# Patient Record
Sex: MALE | Race: WHITE | NOT HISPANIC OR LATINO | Employment: OTHER | URBAN - METROPOLITAN AREA
[De-identification: names, ages, dates, MRNs, and addresses within clinical notes are randomized per-mention and may not be internally consistent; named-entity substitution may affect disease eponyms.]

---

## 2023-03-29 PROBLEM — R39.15 URINARY URGENCY: Status: ACTIVE | Noted: 2023-03-29

## 2023-03-29 PROBLEM — N52.9 ED (ERECTILE DYSFUNCTION): Status: ACTIVE | Noted: 2023-03-29

## 2023-03-29 PROBLEM — E87.1 HYPONATREMIA: Status: ACTIVE | Noted: 2023-03-29

## 2023-03-29 PROBLEM — R73.09 ELEVATED GLUCOSE: Status: ACTIVE | Noted: 2023-03-29

## 2023-03-29 PROBLEM — N18.30 CKD (CHRONIC KIDNEY DISEASE) STAGE 3, GFR 30-59 ML/MIN (MULTI): Status: ACTIVE | Noted: 2023-03-29

## 2023-03-29 PROBLEM — D64.9 ANEMIA: Status: ACTIVE | Noted: 2023-03-29

## 2023-03-29 PROBLEM — K62.0: Status: ACTIVE | Noted: 2023-03-29

## 2023-03-29 PROBLEM — H90.3 BILATERAL SENSORINEURAL HEARING LOSS: Status: ACTIVE | Noted: 2023-03-29

## 2023-03-29 PROBLEM — J34.1 RETENTION CYST OF NASAL SINUS: Status: ACTIVE | Noted: 2023-03-29

## 2023-03-29 PROBLEM — G47.00 INSOMNIA: Status: ACTIVE | Noted: 2023-03-29

## 2023-03-29 PROBLEM — M26.69 TMJ CREPITUS: Status: ACTIVE | Noted: 2023-03-29

## 2023-03-29 PROBLEM — N28.1 RENAL CYST: Status: ACTIVE | Noted: 2023-03-29

## 2023-03-29 PROBLEM — E78.5 HYPERLIPIDEMIA: Status: ACTIVE | Noted: 2023-03-29

## 2023-03-29 PROBLEM — J32.9 CHRONIC SINUSITIS: Status: ACTIVE | Noted: 2023-03-29

## 2023-03-29 PROBLEM — R97.20 ELEVATED PSA: Status: ACTIVE | Noted: 2023-03-29

## 2023-03-29 PROBLEM — E03.8 SUBCLINICAL HYPOTHYROIDISM: Status: ACTIVE | Noted: 2023-03-29

## 2023-03-29 PROBLEM — R90.89 ABNORMAL BRAIN MRI: Status: ACTIVE | Noted: 2023-03-29

## 2023-03-29 PROBLEM — R79.89 ELEVATED SERUM CREATININE: Status: ACTIVE | Noted: 2023-03-29

## 2023-03-29 PROBLEM — R74.01 ELEVATED AST (SGOT): Status: ACTIVE | Noted: 2023-03-29

## 2023-03-29 PROBLEM — N40.0 BENIGN ENLARGEMENT OF PROSTATE: Status: ACTIVE | Noted: 2023-03-29

## 2023-04-11 ENCOUNTER — OFFICE VISIT (OUTPATIENT)
Dept: PRIMARY CARE | Facility: CLINIC | Age: 68
End: 2023-04-11
Payer: MEDICARE

## 2023-04-11 VITALS
WEIGHT: 185.6 LBS | SYSTOLIC BLOOD PRESSURE: 106 MMHG | TEMPERATURE: 98.2 F | DIASTOLIC BLOOD PRESSURE: 64 MMHG | BODY MASS INDEX: 25.89 KG/M2

## 2023-04-11 DIAGNOSIS — R73.09 ELEVATED GLUCOSE: ICD-10-CM

## 2023-04-11 DIAGNOSIS — E03.8 SUBCLINICAL HYPOTHYROIDISM: ICD-10-CM

## 2023-04-11 DIAGNOSIS — D64.9 ANEMIA, UNSPECIFIED TYPE: ICD-10-CM

## 2023-04-11 DIAGNOSIS — E78.5 HYPERLIPIDEMIA, UNSPECIFIED HYPERLIPIDEMIA TYPE: ICD-10-CM

## 2023-04-11 DIAGNOSIS — M54.50 LEFT LOW BACK PAIN, UNSPECIFIED CHRONICITY, UNSPECIFIED WHETHER SCIATICA PRESENT: ICD-10-CM

## 2023-04-11 DIAGNOSIS — N18.31 STAGE 3A CHRONIC KIDNEY DISEASE (MULTI): Primary | ICD-10-CM

## 2023-04-11 DIAGNOSIS — R06.09 DOE (DYSPNEA ON EXERTION): ICD-10-CM

## 2023-04-11 DIAGNOSIS — R74.01 ELEVATED AST (SGOT): ICD-10-CM

## 2023-04-11 DIAGNOSIS — R97.20 ELEVATED PSA: ICD-10-CM

## 2023-04-11 PROBLEM — R79.89 ELEVATED SERUM CREATININE: Status: RESOLVED | Noted: 2023-03-29 | Resolved: 2023-04-11

## 2023-04-11 PROCEDURE — 99214 OFFICE O/P EST MOD 30 MIN: CPT | Performed by: INTERNAL MEDICINE

## 2023-04-11 PROCEDURE — 1160F RVW MEDS BY RX/DR IN RCRD: CPT | Performed by: INTERNAL MEDICINE

## 2023-04-11 PROCEDURE — 1036F TOBACCO NON-USER: CPT | Performed by: INTERNAL MEDICINE

## 2023-04-11 PROCEDURE — 1159F MED LIST DOCD IN RCRD: CPT | Performed by: INTERNAL MEDICINE

## 2023-04-11 PROCEDURE — 93000 ELECTROCARDIOGRAM COMPLETE: CPT | Performed by: INTERNAL MEDICINE

## 2023-04-11 RX ORDER — BUPROPION HYDROCHLORIDE 300 MG/1
1 TABLET ORAL
COMMUNITY
Start: 2021-03-02

## 2023-04-11 RX ORDER — ZOLPIDEM TARTRATE 5 MG/1
TABLET ORAL
COMMUNITY
Start: 2014-06-24 | End: 2024-04-11 | Stop reason: WASHOUT

## 2023-04-11 RX ORDER — GABAPENTIN 300 MG/1
300 CAPSULE ORAL 2 TIMES DAILY
COMMUNITY

## 2023-04-11 RX ORDER — FLUTICASONE PROPIONATE 50 MCG
1 SPRAY, SUSPENSION (ML) NASAL 2 TIMES DAILY
COMMUNITY
Start: 2018-06-12

## 2023-04-11 RX ORDER — LEVOTHYROXINE SODIUM 50 UG/1
1 TABLET ORAL DAILY
COMMUNITY
Start: 2018-09-25 | End: 2023-05-31 | Stop reason: SDUPTHER

## 2023-04-11 RX ORDER — HYDROCODONE BITARTRATE AND ACETAMINOPHEN 5; 325 MG/1; MG/1
1 TABLET ORAL 2 TIMES DAILY
COMMUNITY

## 2023-04-11 RX ORDER — FLUOXETINE HYDROCHLORIDE 20 MG/1
20 CAPSULE ORAL
Qty: 30 CAPSULE | Refills: 2 | COMMUNITY
Start: 2023-01-24 | End: 2024-04-11 | Stop reason: WASHOUT

## 2023-04-11 RX ORDER — OXYBUTYNIN CHLORIDE 5 MG/1
1 TABLET ORAL 2 TIMES DAILY
COMMUNITY
Start: 2022-04-08

## 2023-04-11 RX ORDER — SIMVASTATIN 20 MG/1
TABLET, FILM COATED ORAL
COMMUNITY
Start: 2014-11-16 | End: 2023-10-11 | Stop reason: SDUPTHER

## 2023-04-11 RX ORDER — ALPRAZOLAM 0.5 MG/1
0.5 TABLET ORAL NIGHTLY PRN
COMMUNITY

## 2023-04-11 RX ORDER — TIZANIDINE 4 MG/1
1 TABLET ORAL
COMMUNITY
Start: 2023-01-23

## 2023-04-11 ASSESSMENT — PATIENT HEALTH QUESTIONNAIRE - PHQ9
SUM OF ALL RESPONSES TO PHQ9 QUESTIONS 1 AND 2: 0
1. LITTLE INTEREST OR PLEASURE IN DOING THINGS: NOT AT ALL
2. FEELING DOWN, DEPRESSED OR HOPELESS: NOT AT ALL

## 2023-04-11 NOTE — PROGRESS NOTES
Subjective   Patient ID: Tate Cedeno is a 68 y.o. male who presents for Follow-up (6 month).    HPI   Overall doing well.  Does note mild dyspnea on exertion.  Present by 1 to 2 years.  Perhaps slowly increasing.  No chest pain.  No nausea or vomiting.  Only with heavier yard work.  No PND/orthopnea.  No edema.  Additionally, left low back pain over the past few weeks.  Was better with prednisone.  Working with back surgeon.  No weakness.  No trauma.  No fevers or chills.  Review of Systems   All other systems reviewed and are negative.    Lab Results   Component Value Date    WBC 5.4 09/15/2022    HGB 14.3 09/15/2022    HCT 43.0 09/15/2022     09/15/2022    CHOL 179 09/15/2022    TRIG 68 09/15/2022    HDL 58.2 09/15/2022    ALT 13 09/15/2022    AST 18 11/03/2020     09/15/2022    K 4.3 09/15/2022     09/15/2022    CREATININE 1.14 09/15/2022    BUN 18 09/15/2022    CO2 27 09/15/2022    TSH 1.60 12/12/2022    PSA 4.9 (H) 10/28/2021    HGBA1C 5.6 09/15/2022     Objective   /64   Temp 36.8 °C (98.2 °F)   Wt 84.2 kg (185 lb 9.6 oz)   BMI 25.89 kg/m²     Physical Exam  Constitutional:       Appearance: Normal appearance.   Cardiovascular:      Rate and Rhythm: Normal rate and regular rhythm.      Pulses: Normal pulses.      Heart sounds: No murmur heard.     No gallop.   Pulmonary:      Effort: Pulmonary effort is normal. No respiratory distress.      Breath sounds: Normal breath sounds. No wheezing, rhonchi or rales.   Neurological:      Mental Status: He is alert.   Psychiatric:         Mood and Affect: Mood normal.         Behavior: Behavior normal.         Thought Content: Thought content normal.         Judgment: Judgment normal.         Assessment/Plan     #1 subclinical hypothyroid- + fhx. neg ABs.- on increased rx, f/u labs  #2 PSA- f/u   #3 hyperlipidemia- on target. Continue treatment  #4 ED- stable. Transition to generic Viagra for cost reasons  #5 lipids-on target  #6 AM  "\"flushing\"- resolved. f/u PRN  #7 back pain- resolved  #8 elevated sugar-subtle. retest. diet/exercise reviewed.  #9 hemorrhoid- f/u colorectal surgery  #10 Insomnia- rare zolpidem use. Asked patient to discontinue use.. Reviewed risk of this medicine at length. I have personally reviewed the OARRS report for the patient. This report is scanned into the electronic medical record. I have considered the risks of abuse, dependence, addiction and diversion. I believe that it is clinically appropriate for the patient to be prescribed this medication. has narcan at home. Advised not to use narcotic in same day or use any alcohol.  #11 colon polyps/rectal skin tag-recommend follow-up colonoscopy q3 years (2023) per colorectal surgery  #12 ckd3- reviewed at length. Discussed differential diagnosis. Suspect at least in part due to NSAID use. Reviewed discontinuing all NSAIDs, staying hydrated. No albuminuria. Renal ultrasound relatively unremarkable.  retest.   #13 HAs- \"new daily persistent HA\"- better. f/u neuro [facility]. con't gabapentin  #14 C-spine- increased issues. ongoing epidurals. following w/ NS/painMD. CT/EMG pending.   #15 renal cysts- 2 simple appearing. consider follow-up ultrasound.  Reviewed with patient.  He will contact his urologist to review.  Will let me know if he needs my help.  #16 Mild normocytic anemia- resolved. retest   #16 MALLORY- mild.  Normal ekg/exam.  EST to be complete.  F/up after     shingrix 2/2           "

## 2023-04-11 NOTE — PATIENT INSTRUCTIONS
Please have blood work as we discussed.  Stay focused on healthy lifestyle.  Talk to your urologist about the renal cysts.  Lets get back together in 6 months

## 2023-04-17 ENCOUNTER — LAB (OUTPATIENT)
Dept: LAB | Facility: LAB | Age: 68
End: 2023-04-17
Payer: MEDICARE

## 2023-04-17 DIAGNOSIS — E03.8 SUBCLINICAL HYPOTHYROIDISM: ICD-10-CM

## 2023-04-17 DIAGNOSIS — R73.09 ELEVATED GLUCOSE: ICD-10-CM

## 2023-04-17 DIAGNOSIS — N18.31 STAGE 3A CHRONIC KIDNEY DISEASE (MULTI): ICD-10-CM

## 2023-04-17 DIAGNOSIS — D64.9 ANEMIA, UNSPECIFIED TYPE: ICD-10-CM

## 2023-04-17 LAB
ANION GAP IN SER/PLAS: 12 MMOL/L (ref 10–20)
CALCIUM (MG/DL) IN SER/PLAS: 9 MG/DL (ref 8.6–10.3)
CARBON DIOXIDE, TOTAL (MMOL/L) IN SER/PLAS: 26 MMOL/L (ref 21–32)
CHLORIDE (MMOL/L) IN SER/PLAS: 105 MMOL/L (ref 98–107)
CREATININE (MG/DL) IN SER/PLAS: 1.1 MG/DL (ref 0.5–1.3)
ERYTHROCYTE DISTRIBUTION WIDTH (RATIO) BY AUTOMATED COUNT: 12.2 % (ref 11.5–14.5)
ERYTHROCYTE MEAN CORPUSCULAR HEMOGLOBIN CONCENTRATION (G/DL) BY AUTOMATED: 33 G/DL (ref 32–36)
ERYTHROCYTE MEAN CORPUSCULAR VOLUME (FL) BY AUTOMATED COUNT: 96 FL (ref 80–100)
ERYTHROCYTES (10*6/UL) IN BLOOD BY AUTOMATED COUNT: 4.68 X10E12/L (ref 4.5–5.9)
ESTIMATED AVERAGE GLUCOSE FOR HBA1C: 117 MG/DL
GFR MALE: 73 ML/MIN/1.73M2
GLUCOSE (MG/DL) IN SER/PLAS: 97 MG/DL (ref 74–99)
HEMATOCRIT (%) IN BLOOD BY AUTOMATED COUNT: 44.8 % (ref 41–52)
HEMOGLOBIN (G/DL) IN BLOOD: 14.8 G/DL (ref 13.5–17.5)
HEMOGLOBIN A1C/HEMOGLOBIN TOTAL IN BLOOD: 5.7 %
LEUKOCYTES (10*3/UL) IN BLOOD BY AUTOMATED COUNT: 4.5 X10E9/L (ref 4.4–11.3)
PLATELETS (10*3/UL) IN BLOOD AUTOMATED COUNT: 289 X10E9/L (ref 150–450)
POTASSIUM (MMOL/L) IN SER/PLAS: 4.8 MMOL/L (ref 3.5–5.3)
SODIUM (MMOL/L) IN SER/PLAS: 138 MMOL/L (ref 136–145)
THYROTROPIN (MIU/L) IN SER/PLAS BY DETECTION LIMIT <= 0.05 MIU/L: 2.2 MIU/L (ref 0.44–3.98)
UREA NITROGEN (MG/DL) IN SER/PLAS: 21 MG/DL (ref 6–23)

## 2023-04-17 PROCEDURE — 80048 BASIC METABOLIC PNL TOTAL CA: CPT

## 2023-04-17 PROCEDURE — 36415 COLL VENOUS BLD VENIPUNCTURE: CPT

## 2023-04-17 PROCEDURE — 84443 ASSAY THYROID STIM HORMONE: CPT

## 2023-04-17 PROCEDURE — 83036 HEMOGLOBIN GLYCOSYLATED A1C: CPT

## 2023-04-17 PROCEDURE — 85027 COMPLETE CBC AUTOMATED: CPT

## 2023-05-09 ENCOUNTER — TELEPHONE (OUTPATIENT)
Dept: PRIMARY CARE | Facility: CLINIC | Age: 68
End: 2023-05-09
Payer: MEDICARE

## 2023-05-09 NOTE — TELEPHONE ENCOUNTER
PT having some knee swelling for several weeks, asking if he can get a cortisone injection or does he need to be referred to someone else for this?

## 2023-05-10 DIAGNOSIS — M25.569 ACUTE KNEE PAIN, UNSPECIFIED LATERALITY: ICD-10-CM

## 2023-05-25 ENCOUNTER — TELEPHONE (OUTPATIENT)
Dept: PRIMARY CARE | Facility: CLINIC | Age: 68
End: 2023-05-25
Payer: MEDICARE

## 2023-05-25 NOTE — TELEPHONE ENCOUNTER
Pt left a msg stating that when he had his lumbar MRI done. The results showed a T2 hyperintense hepatic & renal lesions, possible cysts.  He said the liver might e a new finding and wanted to know if he need a follow up  on this

## 2023-05-31 ENCOUNTER — TELEPHONE (OUTPATIENT)
Dept: PRIMARY CARE | Facility: CLINIC | Age: 68
End: 2023-05-31
Payer: MEDICARE

## 2023-05-31 DIAGNOSIS — E03.9 HYPOTHYROIDISM, UNSPECIFIED TYPE: ICD-10-CM

## 2023-05-31 DIAGNOSIS — K76.89 LIVER CYST: ICD-10-CM

## 2023-05-31 RX ORDER — LEVOTHYROXINE SODIUM 50 UG/1
50 TABLET ORAL DAILY
Qty: 90 TABLET | Refills: 3 | Status: SHIPPED | OUTPATIENT
Start: 2023-05-31 | End: 2023-10-11 | Stop reason: SDUPTHER

## 2023-05-31 NOTE — TELEPHONE ENCOUNTER
I spoke with the pt and relayed the msg with understanding.  He said that he would like the US order entered, and will decide soon whether to have it done.  He said that if it turns out to be something other than a cyst, he lynn kick himself for not doing a follow up.

## 2023-05-31 NOTE — TELEPHONE ENCOUNTER
Pt left a msg asking for a refill of his Levothyroxine 50mcg.  Pharm is [facility].

## 2023-07-20 ENCOUNTER — TELEPHONE (OUTPATIENT)
Dept: PRIMARY CARE | Facility: CLINIC | Age: 68
End: 2023-07-20
Payer: MEDICARE

## 2023-07-20 NOTE — TELEPHONE ENCOUNTER
Pt left a msg looking for a order/referral to have a 3yr follow up colonoscopy.  He wants to see Dr. Hooks.

## 2023-07-21 DIAGNOSIS — Z12.11 COLON CANCER SCREENING: ICD-10-CM

## 2023-08-02 ENCOUNTER — TELEPHONE (OUTPATIENT)
Dept: PRIMARY CARE | Facility: CLINIC | Age: 68
End: 2023-08-02
Payer: MEDICARE

## 2023-08-02 NOTE — TELEPHONE ENCOUNTER
PT was referred to Stephanie Hooks for his colonoscopy, she is no longer doing colonoscopies and needs a new referral to whoever you would recommend, preferably [facility] or shantell Diamond. Would like to follow your recommendation,

## 2023-08-03 DIAGNOSIS — Z12.11 COLON CANCER SCREENING: ICD-10-CM

## 2023-10-11 ENCOUNTER — OFFICE VISIT (OUTPATIENT)
Dept: PRIMARY CARE | Facility: CLINIC | Age: 68
End: 2023-10-11
Payer: MEDICARE

## 2023-10-11 VITALS
DIASTOLIC BLOOD PRESSURE: 80 MMHG | HEIGHT: 70 IN | WEIGHT: 178.2 LBS | TEMPERATURE: 97.2 F | SYSTOLIC BLOOD PRESSURE: 120 MMHG | BODY MASS INDEX: 25.51 KG/M2

## 2023-10-11 DIAGNOSIS — Z00.00 ROUTINE CHECK-UP: Primary | ICD-10-CM

## 2023-10-11 DIAGNOSIS — N18.31 STAGE 3A CHRONIC KIDNEY DISEASE (MULTI): ICD-10-CM

## 2023-10-11 DIAGNOSIS — R73.09 ELEVATED GLUCOSE: ICD-10-CM

## 2023-10-11 DIAGNOSIS — J34.1 RETENTION CYST OF NASAL SINUS: ICD-10-CM

## 2023-10-11 DIAGNOSIS — G89.28 CHRONIC PAIN AFTER SURGICAL PROCEDURE FOR MALIGNANT NEOPLASM (MULTI): ICD-10-CM

## 2023-10-11 DIAGNOSIS — F33.42 MAJOR DEPRESSIVE DISORDER, RECURRENT, IN FULL REMISSION (CMS-HCC): ICD-10-CM

## 2023-10-11 DIAGNOSIS — C80.1 CHRONIC PAIN AFTER SURGICAL PROCEDURE FOR MALIGNANT NEOPLASM (MULTI): ICD-10-CM

## 2023-10-11 DIAGNOSIS — Z23 IMMUNIZATION DUE: ICD-10-CM

## 2023-10-11 DIAGNOSIS — E03.9 HYPOTHYROIDISM, UNSPECIFIED TYPE: ICD-10-CM

## 2023-10-11 DIAGNOSIS — E03.8 SUBCLINICAL HYPOTHYROIDISM: ICD-10-CM

## 2023-10-11 DIAGNOSIS — N52.9 ERECTILE DYSFUNCTION, UNSPECIFIED ERECTILE DYSFUNCTION TYPE: ICD-10-CM

## 2023-10-11 DIAGNOSIS — E78.5 HYPERLIPIDEMIA, UNSPECIFIED HYPERLIPIDEMIA TYPE: ICD-10-CM

## 2023-10-11 DIAGNOSIS — G47.00 INSOMNIA, UNSPECIFIED TYPE: ICD-10-CM

## 2023-10-11 PROCEDURE — G0008 ADMIN INFLUENZA VIRUS VAC: HCPCS | Performed by: INTERNAL MEDICINE

## 2023-10-11 PROCEDURE — 1160F RVW MEDS BY RX/DR IN RCRD: CPT | Performed by: INTERNAL MEDICINE

## 2023-10-11 PROCEDURE — 1170F FXNL STATUS ASSESSED: CPT | Performed by: INTERNAL MEDICINE

## 2023-10-11 PROCEDURE — 99213 OFFICE O/P EST LOW 20 MIN: CPT | Performed by: INTERNAL MEDICINE

## 2023-10-11 PROCEDURE — 90662 IIV NO PRSV INCREASED AG IM: CPT | Performed by: INTERNAL MEDICINE

## 2023-10-11 PROCEDURE — G0439 PPPS, SUBSEQ VISIT: HCPCS | Performed by: INTERNAL MEDICINE

## 2023-10-11 PROCEDURE — 1036F TOBACCO NON-USER: CPT | Performed by: INTERNAL MEDICINE

## 2023-10-11 PROCEDURE — 1159F MED LIST DOCD IN RCRD: CPT | Performed by: INTERNAL MEDICINE

## 2023-10-11 PROCEDURE — 1126F AMNT PAIN NOTED NONE PRSNT: CPT | Performed by: INTERNAL MEDICINE

## 2023-10-11 RX ORDER — SIMVASTATIN 20 MG/1
20 TABLET, FILM COATED ORAL NIGHTLY
Qty: 90 TABLET | Refills: 3 | Status: SHIPPED | OUTPATIENT
Start: 2023-10-11 | End: 2024-10-10

## 2023-10-11 RX ORDER — SILDENAFIL 100 MG/1
100 TABLET, FILM COATED ORAL AS NEEDED
COMMUNITY
Start: 2015-07-31 | End: 2023-10-11 | Stop reason: SDUPTHER

## 2023-10-11 RX ORDER — SILDENAFIL 100 MG/1
100 TABLET, FILM COATED ORAL AS NEEDED
Qty: 30 TABLET | Refills: 2 | Status: SHIPPED | OUTPATIENT
Start: 2023-10-11

## 2023-10-11 RX ORDER — AZELASTINE 1 MG/ML
1 SPRAY, METERED NASAL 2 TIMES DAILY
Qty: 36 ML | Refills: 3 | Status: SHIPPED | OUTPATIENT
Start: 2023-10-11 | End: 2024-10-10

## 2023-10-11 RX ORDER — LEVOTHYROXINE SODIUM 50 UG/1
50 TABLET ORAL DAILY
Qty: 90 TABLET | Refills: 3 | Status: SHIPPED | OUTPATIENT
Start: 2023-10-11 | End: 2024-10-10

## 2023-10-11 RX ORDER — TRAZODONE HYDROCHLORIDE 50 MG/1
25-50 TABLET ORAL NIGHTLY PRN
Qty: 30 TABLET | Refills: 1 | Status: SHIPPED | OUTPATIENT
Start: 2023-10-11 | End: 2024-10-10

## 2023-10-11 ASSESSMENT — PATIENT HEALTH QUESTIONNAIRE - PHQ9
1. LITTLE INTEREST OR PLEASURE IN DOING THINGS: NOT AT ALL
2. FEELING DOWN, DEPRESSED OR HOPELESS: NOT AT ALL
SUM OF ALL RESPONSES TO PHQ9 QUESTIONS 1 AND 2: 0

## 2023-10-11 ASSESSMENT — ENCOUNTER SYMPTOMS
DEPRESSION: 0
LOSS OF SENSATION IN FEET: 0
OCCASIONAL FEELINGS OF UNSTEADINESS: 0

## 2023-10-11 NOTE — PROGRESS NOTES
"Subjective   Reason for Visit: Tate Cedeno is an 68 y.o. male here for a Medicare Wellness visit.          Reviewed all medications by prescribing practitioner or clinical pharmacist (such as prescriptions, OTCs, herbal therapies and supplements) and documented in the medical record.    HPI  #1 subclinical hypothyroidism-no fatigue.  Overall feels well  #2 elevated PSA-following with urology  #3 hyperlipidemia  #4 ED  #5 impaired fasting sugar    Patient Care Team:  Asmita Vidal MD as PCP - General     Review of Systems    Objective   Vitals:  /80 (BP Location: Left arm, Patient Position: Sitting, BP Cuff Size: Adult)   Temp 36.2 °C (97.2 °F) (Skin)   Ht 1.784 m (5' 10.25\")   Wt 80.8 kg (178 lb 3.2 oz)   BMI 25.39 kg/m²       Physical Exam  Constitutional:       Appearance: Normal appearance.   Cardiovascular:      Rate and Rhythm: Normal rate and regular rhythm.      Pulses: Normal pulses.      Heart sounds: No murmur heard.     No gallop.   Pulmonary:      Effort: Pulmonary effort is normal. No respiratory distress.      Breath sounds: Normal breath sounds. No wheezing, rhonchi or rales.   Neurological:      Mental Status: He is alert.   Psychiatric:         Mood and Affect: Mood normal.         Behavior: Behavior normal.         Thought Content: Thought content normal.         Judgment: Judgment normal.           Lab Results   Component Value Date    WBC 4.5 04/17/2023    HGB 14.8 04/17/2023    HCT 44.8 04/17/2023     04/17/2023    CHOL 179 09/15/2022    TRIG 68 09/15/2022    HDL 58.2 09/15/2022    ALT 13 09/15/2022    AST 18 11/03/2020     04/17/2023    K 4.8 04/17/2023     04/17/2023    CREATININE 1.10 04/17/2023    BUN 21 04/17/2023    CO2 26 04/17/2023    TSH 2.20 04/17/2023    PSA 4.9 (H) 10/28/2021    HGBA1C 5.7 (A) 04/17/2023      Assessment/Plan   Problem List Items Addressed This Visit       CKD (chronic kidney disease) stage 3, GFR 30-59 ml/min (CMS/[facility])    Relevant " "Orders    Basic Metabolic Panel    CBC    ED (erectile dysfunction)    Relevant Medications    sildenafil (Viagra) 100 mg tablet    Elevated glucose    Relevant Orders    Hemoglobin A1C    Hyperlipidemia    Relevant Medications    simvastatin (Zocor) 20 mg tablet    Other Relevant Orders    Alanine Aminotransferase    Lipid Panel    Insomnia    Relevant Medications    traZODone (Desyrel) 50 mg tablet    Retention cyst of nasal sinus    Relevant Medications    azelastine (Astelin) 137 mcg (0.1 %) nasal spray    Subclinical hypothyroidism    Relevant Medications    levothyroxine (Synthroid, Levoxyl) 50 mcg tablet    Other Relevant Orders    CBC    TSH with reflex to Free T4 if abnormal    Major depressive disorder, recurrent, in full remission (CMS/HCC)    Chronic pain after surgical procedure for malignant neoplasm (CMS/HCC)     Other Visit Diagnoses       Routine check-up    -  Primary    Immunization due        Relevant Orders    Flu vaccine, quadrivalent, high-dose, preservative free, age 65y+ (FLUZONE) (Completed)    Hypothyroidism, unspecified type        Relevant Medications    levothyroxine (Synthroid, Levoxyl) 50 mcg tablet        #1 subclinical hypothyroid- + fhx. neg ABs.- on increased rx, f/u labs  #2 PSA- f/u   #3 hyperlipidemia- on target. Continue treatment  #4 ED- stable. Transition to generic Viagra for cost reasons  #5 MALLORY- resolved. Normal ekg/exam/EST    #6 AM \"flushing\"- resolved. f/u PRN  #7 back pain- resolved  #8 elevated sugar-subtle. retest. diet/exercise reviewed.  #9 hemorrhoid- f/u colorectal surgery  #10 Insomnia- rare zolpidem use. Asked patient to discontinue use.. Reviewed risk of this medicine at length. I have personally reviewed the OARRS report for the patient. This report is scanned into the electronic medical record. I have considered the risks of abuse, dependence, addiction and diversion. I believe that it is clinically appropriate for the patient to be prescribed this " "medication. has narcan at home. Advised not to use narcotic in same day or use any alcohol.  #11 colon polyps/rectal skin tag-recommend follow-up colonoscopy q3 years (2023) per colorectal surgery  #12 ckd3- reviewed at length. Discussed differential diagnosis. Suspect at least in part due to NSAID use. Reviewed discontinuing all NSAIDs, staying hydrated. No albuminuria. Renal ultrasound relatively unremarkable.  retest.   #13 HAs- better. \"new daily persistent HA\"- better. f/u neuro [facility]. con't gabapentin  #14 C-spine- increased issues. ongoing epidurals. following w/ NS/painMD. CT/EMG pending.   #15 renal cysts- f/u    #16 Mild normocytic anemia- resolved. retest   #16 MALLORY- mild.  Normal ekg/exam.  EST to be complete.  F/up after     shingrix 2/2   [place] pending  Rsv vaccine, prevnar 20- reviewed        "

## 2023-10-12 PROBLEM — G89.28: Status: ACTIVE | Noted: 2023-10-12

## 2023-10-12 PROBLEM — C80.1: Status: ACTIVE | Noted: 2023-10-12

## 2023-10-12 PROBLEM — F33.42 MAJOR DEPRESSIVE DISORDER, RECURRENT, IN FULL REMISSION (CMS-HCC): Status: ACTIVE | Noted: 2023-10-12

## 2023-10-12 ASSESSMENT — ACTIVITIES OF DAILY LIVING (ADL)
MANAGING_FINANCES: INDEPENDENT
DRESSING: INDEPENDENT
BATHING: INDEPENDENT
GROCERY_SHOPPING: INDEPENDENT
DOING_HOUSEWORK: INDEPENDENT
TAKING_MEDICATION: INDEPENDENT

## 2023-10-27 ENCOUNTER — LAB (OUTPATIENT)
Dept: LAB | Facility: LAB | Age: 68
End: 2023-10-27
Payer: MEDICARE

## 2023-10-27 DIAGNOSIS — R73.09 ELEVATED GLUCOSE: ICD-10-CM

## 2023-10-27 DIAGNOSIS — N18.31 STAGE 3A CHRONIC KIDNEY DISEASE (MULTI): ICD-10-CM

## 2023-10-27 DIAGNOSIS — E03.8 SUBCLINICAL HYPOTHYROIDISM: ICD-10-CM

## 2023-10-27 DIAGNOSIS — E78.5 HYPERLIPIDEMIA, UNSPECIFIED HYPERLIPIDEMIA TYPE: ICD-10-CM

## 2023-10-27 LAB
ALT SERPL W P-5'-P-CCNC: 11 U/L (ref 10–52)
ANION GAP SERPL CALC-SCNC: 13 MMOL/L (ref 10–20)
BUN SERPL-MCNC: 16 MG/DL (ref 6–23)
CALCIUM SERPL-MCNC: 8.9 MG/DL (ref 8.6–10.3)
CHLORIDE SERPL-SCNC: 103 MMOL/L (ref 98–107)
CHOLEST SERPL-MCNC: 185 MG/DL (ref 0–199)
CHOLESTEROL/HDL RATIO: 3.4
CO2 SERPL-SCNC: 26 MMOL/L (ref 21–32)
CREAT SERPL-MCNC: 1.1 MG/DL (ref 0.5–1.3)
ERYTHROCYTE [DISTWIDTH] IN BLOOD BY AUTOMATED COUNT: 12 % (ref 11.5–14.5)
EST. AVERAGE GLUCOSE BLD GHB EST-MCNC: 114 MG/DL
GFR SERPL CREATININE-BSD FRML MDRD: 73 ML/MIN/1.73M*2
GLUCOSE SERPL-MCNC: 91 MG/DL (ref 74–99)
HBA1C MFR BLD: 5.6 %
HCT VFR BLD AUTO: 44.3 % (ref 41–52)
HDLC SERPL-MCNC: 54.6 MG/DL
HGB BLD-MCNC: 14.7 G/DL (ref 13.5–17.5)
LDLC SERPL CALC-MCNC: 110 MG/DL
MCH RBC QN AUTO: 31.7 PG (ref 26–34)
MCHC RBC AUTO-ENTMCNC: 33.2 G/DL (ref 32–36)
MCV RBC AUTO: 96 FL (ref 80–100)
NON HDL CHOLESTEROL: 130 MG/DL (ref 0–149)
NRBC BLD-RTO: 0 /100 WBCS (ref 0–0)
PLATELET # BLD AUTO: 292 X10*3/UL (ref 150–450)
PMV BLD AUTO: 10.1 FL (ref 7.5–11.5)
POTASSIUM SERPL-SCNC: 4.5 MMOL/L (ref 3.5–5.3)
RBC # BLD AUTO: 4.64 X10*6/UL (ref 4.5–5.9)
SODIUM SERPL-SCNC: 137 MMOL/L (ref 136–145)
TRIGL SERPL-MCNC: 102 MG/DL (ref 0–149)
TSH SERPL-ACNC: 3.87 MIU/L (ref 0.44–3.98)
VLDL: 20 MG/DL (ref 0–40)
WBC # BLD AUTO: 5.2 X10*3/UL (ref 4.4–11.3)

## 2023-10-27 PROCEDURE — 80061 LIPID PANEL: CPT

## 2023-10-27 PROCEDURE — 84443 ASSAY THYROID STIM HORMONE: CPT

## 2023-10-27 PROCEDURE — 85027 COMPLETE CBC AUTOMATED: CPT

## 2023-10-27 PROCEDURE — 36415 COLL VENOUS BLD VENIPUNCTURE: CPT

## 2023-10-27 PROCEDURE — 84460 ALANINE AMINO (ALT) (SGPT): CPT

## 2023-10-27 PROCEDURE — 80048 BASIC METABOLIC PNL TOTAL CA: CPT

## 2023-10-27 PROCEDURE — 83036 HEMOGLOBIN GLYCOSYLATED A1C: CPT

## 2023-11-07 ENCOUNTER — PREP FOR PROCEDURE (OUTPATIENT)
Dept: GASTROENTEROLOGY | Facility: HOSPITAL | Age: 68
End: 2023-11-07
Payer: MEDICARE

## 2023-11-13 RX ORDER — SODIUM CHLORIDE, SODIUM LACTATE, POTASSIUM CHLORIDE, CALCIUM CHLORIDE 600; 310; 30; 20 MG/100ML; MG/100ML; MG/100ML; MG/100ML
20 INJECTION, SOLUTION INTRAVENOUS CONTINUOUS
Status: CANCELLED | OUTPATIENT
Start: 2023-11-13

## 2023-11-13 NOTE — H&P
History Of Present Illness  Tate Cedeno is a 68 y.o. male presenting with colon polyps.     Past Medical History  Past Medical History:   Diagnosis Date    Personal history of malignant neoplasm, unspecified     History of malignant neoplasm    Personal history of other diseases of the musculoskeletal system and connective tissue     History of arthritis    Personal history of other diseases of the nervous system and sense organs     History of cataract    Personal history of other diseases of urinary system     History of kidney problems    Personal history of other mental and behavioral disorders     History of depression    Pure hypercholesterolemia, unspecified     High cholesterol       Surgical History  Past Surgical History:   Procedure Laterality Date    APPENDECTOMY  08/20/2015    Appendectomy    CERVICAL FUSION  08/20/2015    Cervical Vertebral Fusion    NASAL SEPTUM SURGERY  08/20/2015    Nasal Septal Deviation Repair        Social History  He reports that he has never smoked. He has never been exposed to tobacco smoke. He has never used smokeless tobacco. He reports current alcohol use of about 12.0 standard drinks of alcohol per week. He reports current drug use. Drugs: Benzodiazepines and Oxycodone.    Family History  Family History   Problem Relation Name Age of Onset    Other (gastroinstestinal cancer) Mother      Lung cancer Father          Allergies  Bee pollen, Ragweed, and Sulfa (sulfonamide antibiotics)    Review of Systems     Physical Exam     Last Recorded Vitals  There were no vitals taken for this visit.    Relevant Results              Assessment/Plan        Colon polyps    Proceed with colonoscopy              Kameron Hopkins MD

## 2023-11-14 ENCOUNTER — HOSPITAL ENCOUNTER (OUTPATIENT)
Dept: GASTROENTEROLOGY | Facility: HOSPITAL | Age: 68
Setting detail: OUTPATIENT SURGERY
Discharge: HOME | End: 2023-11-14
Payer: MEDICARE

## 2023-11-14 VITALS
TEMPERATURE: 97.9 F | BODY MASS INDEX: 23.1 KG/M2 | DIASTOLIC BLOOD PRESSURE: 88 MMHG | OXYGEN SATURATION: 98 % | HEIGHT: 71 IN | WEIGHT: 165 LBS | HEART RATE: 72 BPM | RESPIRATION RATE: 21 BRPM | SYSTOLIC BLOOD PRESSURE: 116 MMHG

## 2023-11-14 DIAGNOSIS — Z12.11 ENCOUNTER FOR SCREENING FOR MALIGNANT NEOPLASM OF COLON: ICD-10-CM

## 2023-11-14 PROCEDURE — G0500 MOD SEDAT ENDO SERVICE >5YRS: HCPCS | Performed by: INTERNAL MEDICINE

## 2023-11-14 PROCEDURE — 88305 TISSUE EXAM BY PATHOLOGIST: CPT | Performed by: PATHOLOGY

## 2023-11-14 PROCEDURE — 45385 COLONOSCOPY W/LESION REMOVAL: CPT | Performed by: INTERNAL MEDICINE

## 2023-11-14 PROCEDURE — 7100000010 HC PHASE TWO TIME - EACH INCREMENTAL 1 MINUTE: Performed by: INTERNAL MEDICINE

## 2023-11-14 PROCEDURE — 94760 N-INVAS EAR/PLS OXIMETRY 1: CPT

## 2023-11-14 PROCEDURE — 7100000009 HC PHASE TWO TIME - INITIAL BASE CHARGE: Performed by: INTERNAL MEDICINE

## 2023-11-14 PROCEDURE — 88305 TISSUE EXAM BY PATHOLOGIST: CPT | Mod: TC,SUR | Performed by: INTERNAL MEDICINE

## 2023-11-14 PROCEDURE — 99153 MOD SED SAME PHYS/QHP EA: CPT | Performed by: INTERNAL MEDICINE

## 2023-11-14 PROCEDURE — 2500000004 HC RX 250 GENERAL PHARMACY W/ HCPCS (ALT 636 FOR OP/ED): Performed by: INTERNAL MEDICINE

## 2023-11-14 PROCEDURE — 3700000012 HC SEDATION LEVEL 5+ TIME - INITIAL 15 MINUTES 5/> YEARS: Performed by: INTERNAL MEDICINE

## 2023-11-14 RX ORDER — SODIUM CHLORIDE, SODIUM LACTATE, POTASSIUM CHLORIDE, CALCIUM CHLORIDE 600; 310; 30; 20 MG/100ML; MG/100ML; MG/100ML; MG/100ML
20 INJECTION, SOLUTION INTRAVENOUS CONTINUOUS
Status: DISCONTINUED | OUTPATIENT
Start: 2023-11-14 | End: 2023-11-15 | Stop reason: HOSPADM

## 2023-11-14 RX ORDER — MIDAZOLAM HYDROCHLORIDE 1 MG/ML
INJECTION, SOLUTION INTRAMUSCULAR; INTRAVENOUS AS NEEDED
Status: COMPLETED | OUTPATIENT
Start: 2023-11-14 | End: 2023-11-14

## 2023-11-14 RX ORDER — FENTANYL CITRATE 50 UG/ML
INJECTION, SOLUTION INTRAMUSCULAR; INTRAVENOUS AS NEEDED
Status: COMPLETED | OUTPATIENT
Start: 2023-11-14 | End: 2023-11-14

## 2023-11-14 RX ORDER — FLUOXETINE HYDROCHLORIDE 20 MG/1
20 CAPSULE ORAL DAILY
COMMUNITY
End: 2024-04-11 | Stop reason: WASHOUT

## 2023-11-14 RX ADMIN — FENTANYL CITRATE 25 MCG: 50 INJECTION, SOLUTION INTRAMUSCULAR; INTRAVENOUS at 12:37

## 2023-11-14 RX ADMIN — MIDAZOLAM 1 MG: 1 INJECTION INTRAMUSCULAR; INTRAVENOUS at 12:37

## 2023-11-14 RX ADMIN — FENTANYL CITRATE 75 MCG: 50 INJECTION, SOLUTION INTRAMUSCULAR; INTRAVENOUS at 12:35

## 2023-11-14 RX ADMIN — MIDAZOLAM 2 MG: 1 INJECTION INTRAMUSCULAR; INTRAVENOUS at 12:35

## 2023-11-14 ASSESSMENT — PAIN - FUNCTIONAL ASSESSMENT
PAIN_FUNCTIONAL_ASSESSMENT: 0-10

## 2023-11-14 ASSESSMENT — PAIN SCALES - GENERAL
PAINLEVEL_OUTOF10: 0 - NO PAIN

## 2023-11-14 ASSESSMENT — COLUMBIA-SUICIDE SEVERITY RATING SCALE - C-SSRS
6. HAVE YOU EVER DONE ANYTHING, STARTED TO DO ANYTHING, OR PREPARED TO DO ANYTHING TO END YOUR LIFE?: NO
1. IN THE PAST MONTH, HAVE YOU WISHED YOU WERE DEAD OR WISHED YOU COULD GO TO SLEEP AND NOT WAKE UP?: NO
2. HAVE YOU ACTUALLY HAD ANY THOUGHTS OF KILLING YOURSELF?: NO

## 2023-11-14 NOTE — DISCHARGE INSTRUCTIONS

## 2023-11-14 NOTE — PRE-SEDATION DOCUMENTATION
Patient: Tate Cedeno  MRN: [id number]    Pre-sedation Evaluation:  Sedation necessary for: Analgesia  Requesting service:      History of Present Illness: 67 yo man here for eval of colon polyps     Past Medical History:   Diagnosis Date    Kidney stone 02/04/2015    Melanoma (CMS/HCC) 07/12/2022    Personal history of malignant neoplasm, unspecified     History of malignant neoplasm    Personal history of other diseases of the musculoskeletal system and connective tissue     History of arthritis    Personal history of other diseases of the nervous system and sense organs     History of cataract    Personal history of other diseases of urinary system     History of kidney problems    Personal history of other mental and behavioral disorders     History of depression    PONV (postoperative nausea and vomiting)     Pure hypercholesterolemia, unspecified     High cholesterol       Principle problems:  Patient Active Problem List    Diagnosis Date Noted    Major depressive disorder, recurrent, in full remission (CMS/HCC) 10/12/2023    Chronic pain after surgical procedure for malignant neoplasm (CMS/HCC) 10/12/2023    Abnormal brain MRI 03/29/2023    Chronic sinusitis 03/29/2023    Anemia 03/29/2023    Benign enlargement of prostate 03/29/2023    Bilateral sensorineural hearing loss 03/29/2023    CKD (chronic kidney disease) stage 3, GFR 30-59 ml/min (CMS/[facility]) 03/29/2023    ED (erectile dysfunction) 03/29/2023    Elevated glucose 03/29/2023    Elevated PSA 03/29/2023    Elevated AST (SGOT) 03/29/2023    Hyperlipidemia 03/29/2023    Hyponatremia 03/29/2023    Inflammatory polyp of anus 03/29/2023    Insomnia 03/29/2023    Renal cyst 03/29/2023    Retention cyst of nasal sinus 03/29/2023    Subclinical hypothyroidism 03/29/2023    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 Take before meals.      FLUoxetine (PROzac) 20 mg capsule Take 1 capsule (20 mg) by mouth once daily.      gabapentin (Neurontin) 300 mg capsule Take 1 capsule (300 mg) by mouth 2 times a day.      levothyroxine (Synthroid, Levoxyl) 50 mcg tablet Take 1 tablet (50 mcg) by mouth once daily. 90 tablet 3    simvastatin (Zocor) 20 mg tablet Take 1 tablet (20 mg) by mouth once daily at bedtime. 90 tablet 3    ALPRAZolam (Xanax) 0.5 mg tablet Take 1 tablet (0.5 mg) by mouth as needed at bedtime for anxiety.      azelastine (Astelin) 137 mcg (0.1 %) nasal spray Administer 1 spray into each nostril 2 times a day. Use in each nostril as directed 36 mL 3    FLUoxetine (PROzac) 20 mg capsule Take 1 capsule (20 mg) by mouth once daily. 30 capsule 2    fluticasone (Flonase) 50 mcg/actuation nasal spray Administer 1 spray into affected nostril(s) 2 times a day.      HYDROcodone-acetaminophen (Norco) 5-325 mg tablet Take 1 tablet by mouth 2 times a day.      oxybutynin (Ditropan) 5 mg tablet Take 1 tablet (5 mg) by mouth 2 times a day.      sildenafil (Viagra) 100 mg tablet Take 1 tablet (100 mg) by mouth if needed for erectile dysfunction. 30 tablet 2    tiZANidine (Zanaflex) 4 mg tablet Take 1 tablet (4 mg) by mouth every 6 hours during the day.      traZODone (Desyrel) 50 mg tablet Take 0.5-1 tablets (25-50 mg) by mouth as needed at bedtime for sleep. 30 tablet 1    zolpidem (Ambien) 5 mg tablet Take by mouth.       No current facility-administered medications for this encounter.     Past Surgical History:   has a past surgical history that includes Appendectomy (08/20/2015); Nasal septum surgery (08/20/2015); Cervical fusion (08/20/2015); and Hernia repair (Right, 01/01/1963).    Recent  sedation/surgery (24 hours) No    Review of Systems:  Please check all that apply: No significant medical history        NPO guidelines met: Yes    Physical Exam    Airway  Mallampati: II     Cardiovascular - normal exam     Dental - normal exam     Pulmonary - normal exam         Plan    ASA 2     Moderate          Continue Prednisolone 5mg qd, Methrotrexate 12.5 weekly (every Wednesday only), Leflunomide 10mg qd  Continue Folic Acid along with MTX

## 2023-11-27 LAB
LABORATORY COMMENT REPORT: NORMAL
PATH REPORT.FINAL DX SPEC: NORMAL
PATH REPORT.GROSS SPEC: NORMAL
PATH REPORT.TOTAL CANCER: NORMAL

## 2023-11-27 NOTE — ADDENDUM NOTE
Encounter addended by: Kameron Hopkins MD on: 11/27/2023 3:01 PM   Actions taken: Image edited, Result note filed, Results reviewed in IB

## 2024-01-03 ENCOUNTER — OFFICE VISIT (OUTPATIENT)
Dept: ORTHOPEDIC SURGERY | Facility: CLINIC | Age: 69
End: 2024-01-03
Payer: MEDICARE

## 2024-01-03 DIAGNOSIS — M17.12 PRIMARY OSTEOARTHRITIS OF LEFT KNEE: Primary | ICD-10-CM

## 2024-01-03 PROCEDURE — 20610 DRAIN/INJ JOINT/BURSA W/O US: CPT | Performed by: ORTHOPAEDIC SURGERY

## 2024-01-03 PROCEDURE — 1126F AMNT PAIN NOTED NONE PRSNT: CPT | Performed by: ORTHOPAEDIC SURGERY

## 2024-01-03 PROCEDURE — 1036F TOBACCO NON-USER: CPT | Performed by: ORTHOPAEDIC SURGERY

## 2024-01-03 PROCEDURE — 1159F MED LIST DOCD IN RCRD: CPT | Performed by: ORTHOPAEDIC SURGERY

## 2024-01-03 PROCEDURE — 1160F RVW MEDS BY RX/DR IN RCRD: CPT | Performed by: ORTHOPAEDIC SURGERY

## 2024-01-03 PROCEDURE — 99214 OFFICE O/P EST MOD 30 MIN: CPT | Performed by: ORTHOPAEDIC SURGERY

## 2024-01-03 RX ORDER — TRIAMCINOLONE ACETONIDE 40 MG/ML
1 INJECTION, SUSPENSION INTRA-ARTICULAR; INTRAMUSCULAR
Status: COMPLETED | OUTPATIENT
Start: 2024-01-03 | End: 2024-01-03

## 2024-01-03 RX ADMIN — TRIAMCINOLONE ACETONIDE 1 ML: 40 INJECTION, SUSPENSION INTRA-ARTICULAR; INTRAMUSCULAR at 10:18

## 2024-01-03 NOTE — PROGRESS NOTES
This is a consultation from Dr. Asmita Vidal MD for   Chief Complaint   Patient presents with    Left Knee - Pain       This is a 68 y.o. male who presents for follow-up for his left knee.  Patient has left knee arthritis, and cortisone injection in May of last year.  It was very helpful to him and worked for a long time.  He since had return of symptoms exacerbation of his pain over the last 3 to 4 weeks.  Sharp stabbing pain over the medial knee worse with walking.  Occasionally taking Tylenol.  He still golfs and is able to do his normal activity.    Physical Exam    There has been no interval change in this patient's past medical, surgical, medications, allergies, family history or social history since the most recent visit to a provider within our department. 14 point review of systems was performed, reviewed, and negative except for pertinent positives documented in the history of present illness.     Constitutional: well developed, well nourished male in no acute distress  Psychiatric: normal mood, appropriate affect  Eyes: sclera anicteric  HENT: normocephalic/atraumatic  CV: regular rate and rhythm   Respiratory: non labored breathing  Integumentary: no rash  Neurological: moves all extremities    Left knee exam: skin intact no lacerations or abrations.  1+ effusion.  Tender medial joint line. negative log roll negative patellar grind. ROM 0-120. stable to varus and valgus stress at 0 and 30 degrees. negative lachman negative posterior drawer negative sandra. 5/5 ehl/fhl/gs/ta. silt s/s/sp/dp/t. 2+ dp/pt        L Inj/Asp: L knee on 1/3/2024 10:18 AM  Indications: pain and joint swelling  Details: 22 G needle, anterolateral approach  Medications: 1 mL triamcinolone acetonide 40 mg/mL    Discussion:  I discussed the conservative treatment options for knee osteoarthritis including but not limited to physical therapy, oral NSAIDS, activity and lifestyle modification, and corticosteroid injections. Pt has  elected to undergo a cortisone injection today. I have explained the risk and benefits of an injection including the possibility of joint infection, bleeding, damage to cartilage, allergic reaction. Patient verbalized understanding and gave verbal consent wishes to proceed with a intra-articular cortisone injection for their knee.    Procedure:  After discussing the risk and benefits of the procedure, we proceeded with an intra-articular left knee injection. We discussed the risks and benefits and potential morbidity related to the treatment, and to the prescription medication administered in the injection    With the patient's informed verbal consent, the left knee was prepped in standard sterile fashion with Chlorhexidine. The skin was then anesthetized with ethyl chloride spray and cleaned again with Chlorhexidine. The knee was then apirated/injected with a prefilled 20-gauge syringe of 40 mg Kenalog + 4 ml Lidocaine using the lateral approach without complications.  The patient tolerated this well and felt immediate initial relief of symptoms. A bandaid was applied and the patient ambulated out of the clinic on ther own accord without difficulty. Patient was instructed to avoid physical activity for 24-48 hours to prevent the knees from swelling and may ice the knees as tolerated. Patient should contact the office if any signs of of infection appear: redness, fever, chills, drainage, swelling or warmth to the knees.  Pt understands that the injections can be repeated no sooner than 3 months.  Procedure, treatment alternatives, risks and benefits explained, specific risks discussed. Consent was given by the patient. Immediately prior to procedure a time out was called to verify the correct patient, procedure, equipment, support staff and site/side marked as required. Patient was prepped and draped in the usual sterile fashion.             Impression/Plan: This is a 68 y.o. male with left knee arthritis.  I had an  "in depth discussion with the patient regarding treatment options for arthritis and their relative risks and benefits. We reviewed surgical and nonsurgical option for treatment. Treatments include anti inflammatory medications, physical therapy, weight loss, activity modification, use of assistive devices, injection therapies. We discussed current prescriptions and risks and benefits of continuation of prescription medication as apporpriate. We discussed that arthritis is often progressive over time, an in end stage arthritis surgical interventions can be considered, including arthroplasty. All questions were answered and the patient voiced their understanding.  I will see him back.    BMI Readings from Last 1 Encounters:   11/14/23 23.01 kg/m²      Lab Results   Component Value Date    CREATININE 1.10 10/27/2023     Tobacco Use: Low Risk  (1/3/2024)    Patient History     Smoking Tobacco Use: Never     Smokeless Tobacco Use: Never     Passive Exposure: Never      Computed MELD 3.0 unavailable. Necessary lab results were not found in the last year.  Computed MELD-Na unavailable. Necessary lab results were not found in the last year.       Lab Results   Component Value Date    HGBA1C 5.6 10/27/2023     No results found for: \"STAPHMRSASCR\"  "

## 2024-02-08 ENCOUNTER — TELEPHONE (OUTPATIENT)
Dept: PRIMARY CARE | Facility: CLINIC | Age: 69
End: 2024-02-08
Payer: MEDICARE

## 2024-02-09 NOTE — TELEPHONE ENCOUNTER
I spoke with the pt again when he returned my call today.  He wanted to know if a kidney stone could cause the upper abd pain, bloating, and gas.  He said he will go to the ER down there in [place], but had some concerns as stomach CA causes all these symptoms.

## 2024-02-12 NOTE — TELEPHONE ENCOUNTER
Pt DID go to the ER and they did a complete work up on him.  Blood work was good, EKG normal, CT scan of Kidneys showed no stones, looked normal.  They believe he had a little gastritis and was given Carafate and pepcid.  They did a UA and it showed no blood, but feels he might have a little infection due to his enlarged prostate.  They recommended a change in his diet cutting out acidic sauces, caffeine, and such, and to follow up with his urologist if this happens again before his appt in May with them.  He said things are better now that he was taking the meds.

## 2024-02-19 ENCOUNTER — TELEPHONE (OUTPATIENT)
Dept: PRIMARY CARE | Facility: CLINIC | Age: 69
End: 2024-02-19
Payer: MEDICARE

## 2024-02-19 NOTE — TELEPHONE ENCOUNTER
Pt left a msg that he was told by the ER, that if his S/S didn't improve or go away, that he was to follow up with GI.  Pt said that his S/S went away for a day and they came back.  He said he will be back in [place] in 2 1/2 weeks and would like a referral to see [facility].

## 2024-02-20 DIAGNOSIS — R12 HEARTBURN: ICD-10-CM

## 2024-02-20 DIAGNOSIS — R10.84 GENERALIZED ABDOMINAL PAIN: ICD-10-CM

## 2024-02-20 DIAGNOSIS — K21.9 GASTROESOPHAGEAL REFLUX DISEASE, UNSPECIFIED WHETHER ESOPHAGITIS PRESENT: ICD-10-CM

## 2024-02-21 NOTE — TELEPHONE ENCOUNTER
Spoke with the pt and he told me he is already schedule to see GI in the end of March, after he returns.

## 2024-03-18 ENCOUNTER — APPOINTMENT (OUTPATIENT)
Dept: GASTROENTEROLOGY | Facility: CLINIC | Age: 69
End: 2024-03-18
Payer: MEDICARE

## 2024-03-21 ENCOUNTER — OFFICE VISIT (OUTPATIENT)
Dept: GASTROENTEROLOGY | Facility: HOSPITAL | Age: 69
End: 2024-03-21
Payer: MEDICARE

## 2024-03-21 VITALS
TEMPERATURE: 98 F | HEART RATE: 79 BPM | BODY MASS INDEX: 23.7 KG/M2 | SYSTOLIC BLOOD PRESSURE: 118 MMHG | OXYGEN SATURATION: 98 % | WEIGHT: 175 LBS | HEIGHT: 72 IN | DIASTOLIC BLOOD PRESSURE: 80 MMHG

## 2024-03-21 DIAGNOSIS — R10.84 GENERALIZED ABDOMINAL PAIN: ICD-10-CM

## 2024-03-21 DIAGNOSIS — R12 HEARTBURN: ICD-10-CM

## 2024-03-21 DIAGNOSIS — K21.9 GASTROESOPHAGEAL REFLUX DISEASE, UNSPECIFIED WHETHER ESOPHAGITIS PRESENT: ICD-10-CM

## 2024-03-21 PROCEDURE — 1160F RVW MEDS BY RX/DR IN RCRD: CPT | Performed by: STUDENT IN AN ORGANIZED HEALTH CARE EDUCATION/TRAINING PROGRAM

## 2024-03-21 PROCEDURE — 1125F AMNT PAIN NOTED PAIN PRSNT: CPT | Performed by: STUDENT IN AN ORGANIZED HEALTH CARE EDUCATION/TRAINING PROGRAM

## 2024-03-21 PROCEDURE — 99215 OFFICE O/P EST HI 40 MIN: CPT | Performed by: STUDENT IN AN ORGANIZED HEALTH CARE EDUCATION/TRAINING PROGRAM

## 2024-03-21 PROCEDURE — 1036F TOBACCO NON-USER: CPT | Performed by: STUDENT IN AN ORGANIZED HEALTH CARE EDUCATION/TRAINING PROGRAM

## 2024-03-21 PROCEDURE — 99215 OFFICE O/P EST HI 40 MIN: CPT | Mod: GC | Performed by: STUDENT IN AN ORGANIZED HEALTH CARE EDUCATION/TRAINING PROGRAM

## 2024-03-21 PROCEDURE — 1159F MED LIST DOCD IN RCRD: CPT | Performed by: STUDENT IN AN ORGANIZED HEALTH CARE EDUCATION/TRAINING PROGRAM

## 2024-03-21 RX ORDER — PANTOPRAZOLE SODIUM 40 MG/1
40 TABLET, DELAYED RELEASE ORAL
Qty: 60 TABLET | Refills: 0 | Status: SHIPPED | OUTPATIENT
Start: 2024-03-21 | End: 2024-05-14 | Stop reason: SDUPTHER

## 2024-03-21 ASSESSMENT — PAIN SCALES - GENERAL: PAINLEVEL: 4

## 2024-03-21 NOTE — PATIENT INSTRUCTIONS
Thank you for coming to see us in the GI clinic.     It is unclear as to why you suddenly started having this constellation of symptoms. It is possible you have some irritation in your stomach and esophagus.     Please continue to abstain from alcohol and coffee. You should continue taking Pantoprazole or Omeprazole 40mg once a day in the morning on an empty stomach.     We will also schedule you for an EGD.     In the meantime, if your symptoms worsen, please let us know. You can send me a message on [employer].     Take care!

## 2024-04-01 ENCOUNTER — HOSPITAL ENCOUNTER (OUTPATIENT)
Dept: GASTROENTEROLOGY | Facility: HOSPITAL | Age: 69
Setting detail: OUTPATIENT SURGERY
Discharge: HOME | End: 2024-04-01
Payer: MEDICARE

## 2024-04-01 VITALS
HEART RATE: 71 BPM | TEMPERATURE: 98.6 F | OXYGEN SATURATION: 97 % | WEIGHT: 170 LBS | DIASTOLIC BLOOD PRESSURE: 81 MMHG | HEIGHT: 72 IN | SYSTOLIC BLOOD PRESSURE: 127 MMHG | BODY MASS INDEX: 23.03 KG/M2 | RESPIRATION RATE: 14 BRPM

## 2024-04-01 DIAGNOSIS — R10.13 DYSPEPSIA: ICD-10-CM

## 2024-04-01 DIAGNOSIS — R10.84 GENERALIZED ABDOMINAL PAIN: ICD-10-CM

## 2024-04-01 DIAGNOSIS — K21.9 GASTROESOPHAGEAL REFLUX DISEASE, UNSPECIFIED WHETHER ESOPHAGITIS PRESENT: Primary | ICD-10-CM

## 2024-04-01 DIAGNOSIS — R63.4 WEIGHT LOSS: ICD-10-CM

## 2024-04-01 DIAGNOSIS — R12 HEARTBURN: ICD-10-CM

## 2024-04-01 PROCEDURE — 7100000009 HC PHASE TWO TIME - INITIAL BASE CHARGE

## 2024-04-01 PROCEDURE — G0500 MOD SEDAT ENDO SERVICE >5YRS: HCPCS | Performed by: INTERNAL MEDICINE

## 2024-04-01 PROCEDURE — 2500000004 HC RX 250 GENERAL PHARMACY W/ HCPCS (ALT 636 FOR OP/ED): Performed by: INTERNAL MEDICINE

## 2024-04-01 PROCEDURE — 3700000012 HC SEDATION LEVEL 5+ TIME - INITIAL 15 MINUTES 5/> YEARS

## 2024-04-01 PROCEDURE — 88305 TISSUE EXAM BY PATHOLOGIST: CPT | Mod: TC,SUR | Performed by: INTERNAL MEDICINE

## 2024-04-01 PROCEDURE — 43239 EGD BIOPSY SINGLE/MULTIPLE: CPT | Performed by: INTERNAL MEDICINE

## 2024-04-01 PROCEDURE — 88305 TISSUE EXAM BY PATHOLOGIST: CPT | Performed by: STUDENT IN AN ORGANIZED HEALTH CARE EDUCATION/TRAINING PROGRAM

## 2024-04-01 PROCEDURE — 7100000010 HC PHASE TWO TIME - EACH INCREMENTAL 1 MINUTE

## 2024-04-01 RX ORDER — FENTANYL CITRATE 50 UG/ML
INJECTION, SOLUTION INTRAMUSCULAR; INTRAVENOUS AS NEEDED
Status: COMPLETED | OUTPATIENT
Start: 2024-04-01 | End: 2024-04-01

## 2024-04-01 RX ORDER — MIDAZOLAM HYDROCHLORIDE 1 MG/ML
INJECTION, SOLUTION INTRAMUSCULAR; INTRAVENOUS AS NEEDED
Status: COMPLETED | OUTPATIENT
Start: 2024-04-01 | End: 2024-04-01

## 2024-04-01 RX ADMIN — MIDAZOLAM 2 MG: 1 INJECTION INTRAMUSCULAR; INTRAVENOUS at 08:47

## 2024-04-01 RX ADMIN — FENTANYL CITRATE 25 MCG: 50 INJECTION, SOLUTION INTRAMUSCULAR; INTRAVENOUS at 08:49

## 2024-04-01 RX ADMIN — MIDAZOLAM 1 MG: 1 INJECTION INTRAMUSCULAR; INTRAVENOUS at 08:52

## 2024-04-01 RX ADMIN — FENTANYL CITRATE 25 MCG: 50 INJECTION, SOLUTION INTRAMUSCULAR; INTRAVENOUS at 08:52

## 2024-04-01 RX ADMIN — MIDAZOLAM 1 MG: 1 INJECTION INTRAMUSCULAR; INTRAVENOUS at 08:49

## 2024-04-01 RX ADMIN — FENTANYL CITRATE 50 MCG: 50 INJECTION, SOLUTION INTRAMUSCULAR; INTRAVENOUS at 08:47

## 2024-04-01 ASSESSMENT — PAIN SCALES - GENERAL
PAINLEVEL_OUTOF10: 0 - NO PAIN
PAINLEVEL_OUTOF10: 0 - NO PAIN
PAINLEVEL_OUTOF10: 4
PAINLEVEL_OUTOF10: 0 - NO PAIN

## 2024-04-01 ASSESSMENT — PAIN - FUNCTIONAL ASSESSMENT
PAIN_FUNCTIONAL_ASSESSMENT: 0-10
PAIN_FUNCTIONAL_ASSESSMENT: UNABLE TO SELF-REPORT
PAIN_FUNCTIONAL_ASSESSMENT: 0-10
PAIN_FUNCTIONAL_ASSESSMENT: UNABLE TO SELF-REPORT

## 2024-04-01 ASSESSMENT — COLUMBIA-SUICIDE SEVERITY RATING SCALE - C-SSRS
2. HAVE YOU ACTUALLY HAD ANY THOUGHTS OF KILLING YOURSELF?: NO
6. HAVE YOU EVER DONE ANYTHING, STARTED TO DO ANYTHING, OR PREPARED TO DO ANYTHING TO END YOUR LIFE?: NO
1. IN THE PAST MONTH, HAVE YOU WISHED YOU WERE DEAD OR WISHED YOU COULD GO TO SLEEP AND NOT WAKE UP?: NO

## 2024-04-01 NOTE — H&P
History Of Present Illness  Tate Cedeno is a 69 y.o. male presenting with abdominal pain, dyspepsia, heartburn, and weight loss for EGD.     Past Medical History  Past Medical History:   Diagnosis Date    Kidney stone 02/04/2015    Melanoma (CMS/HCC) 07/12/2022    Personal history of malignant neoplasm, unspecified     History of malignant neoplasm    Personal history of other diseases of the musculoskeletal system and connective tissue     History of arthritis    Personal history of other diseases of the nervous system and sense organs     History of cataract    Personal history of other diseases of urinary system     History of kidney problems    Personal history of other mental and behavioral disorders     History of depression    PONV (postoperative nausea and vomiting)     Pure hypercholesterolemia, unspecified     High cholesterol     Surgical History  Past Surgical History:   Procedure Laterality Date    APPENDECTOMY  08/20/2015    Appendectomy    CERVICAL FUSION  08/20/2015    Cervical Vertebral Fusion    HERNIA REPAIR Right 01/01/1963    NASAL SEPTUM SURGERY  08/20/2015    Nasal Septal Deviation Repair     Social History  He reports that he has never smoked. He has never been exposed to tobacco smoke. He has never used smokeless tobacco. He reports current alcohol use of about 12.0 standard drinks of alcohol per week. He reports current drug use. Drugs: Benzodiazepines and Oxycodone.    Family History  Family History   Problem Relation Name Age of Onset    Other (gastroinstestinal cancer) Mother      Lung cancer Father          Allergies  Allergies   Allergen Reactions    Bee Pollen Unknown    Ragweed Unknown    Sulfa (Sulfonamide Antibiotics) Unknown       Pre-sedation Evaluation:  ASA Classification - ASA 2 - Patient with mild systemic disease with no functional limitations  Mallampati Score - II (hard and soft palate, upper portion of tonsils anduvula visible)    Physical Exam  Constitutional:        Appearance: Normal appearance.   HENT:      Mouth/Throat:      Mouth: Mucous membranes are moist.   Eyes:      Conjunctiva/sclera: Conjunctivae normal.   Cardiovascular:      Rate and Rhythm: Normal rate.   Pulmonary:      Effort: Pulmonary effort is normal.   Abdominal:      Palpations: Abdomen is soft.   Skin:     General: Skin is warm.   Neurological:      Mental Status: He is oriented to person, place, and time.   Psychiatric:         Mood and Affect: Mood normal.          Last Recorded Vitals  Blood pressure 137/73, pulse 73, temperature 37 °C (98.6 °F), temperature source Temporal, resp. rate 17, height 1.829 m (6'), weight 77.1 kg (170 lb), SpO2 100 %.     Assessment/Plan   abdominal pain, dyspepsia, heartburn, and weight loss for EGD     PTA/Current Medications:  (Not in a hospital admission)    Current Outpatient Medications   Medication Sig Dispense Refill    ALPRAZolam (Xanax) 0.5 mg tablet Take 1 tablet (0.5 mg) by mouth as needed at bedtime for anxiety.      buPROPion XL (Wellbutrin XL) 300 mg 24 hr tablet Take 1 tablet (300 mg) by mouth once daily in the morning. Take before meals.      FLUoxetine (PROzac) 20 mg capsule Take 1 capsule (20 mg) by mouth once daily.      gabapentin (Neurontin) 300 mg capsule Take 1 capsule (300 mg) by mouth 2 times a day.      HYDROcodone-acetaminophen (Norco) 5-325 mg tablet Take 1 tablet by mouth 2 times a day.      levothyroxine (Synthroid, Levoxyl) 50 mcg tablet Take 1 tablet (50 mcg) by mouth once daily. 90 tablet 3    pantoprazole (ProtoNix) 40 mg EC tablet Take 1 tablet (40 mg) by mouth once daily in the morning. Take before meals. Do not crush, chew, or split. 60 tablet 0    sildenafil (Viagra) 100 mg tablet Take 1 tablet (100 mg) by mouth if needed for erectile dysfunction. 30 tablet 2    simvastatin (Zocor) 20 mg tablet Take 1 tablet (20 mg) by mouth once daily at bedtime. 90 tablet 3    traZODone (Desyrel) 50 mg tablet Take 0.5-1 tablets (25-50 mg) by mouth as  needed at bedtime for sleep. 30 tablet 1    azelastine (Astelin) 137 mcg (0.1 %) nasal spray Administer 1 spray into each nostril 2 times a day. Use in each nostril as directed 36 mL 3    FLUoxetine (PROzac) 20 mg capsule Take 1 capsule (20 mg) by mouth once daily. 30 capsule 2    fluticasone (Flonase) 50 mcg/actuation nasal spray Administer 1 spray into affected nostril(s) 2 times a day.      oxybutynin (Ditropan) 5 mg tablet Take 1 tablet (5 mg) by mouth 2 times a day.      tiZANidine (Zanaflex) 4 mg tablet Take 1 tablet (4 mg) by mouth every 6 hours during the day.      zolpidem (Ambien) 5 mg tablet Take by mouth.       No current facility-administered medications for this encounter.     Bronson Garcia MD

## 2024-04-01 NOTE — DISCHARGE INSTRUCTIONS

## 2024-04-01 NOTE — PROGRESS NOTES
Gastroenterology Clinic Consult Note    Reason For Consult  Abdominal pain    History Of Present Illness  Tate Cedeno is a 69 y.o. male with a history of CKD stage 3, erectile dysfunction, hyperlipidemia, insomnia, hypothyroidism, elevated BPH, major depressive disorder in remission, and osteoarthritis. He presents to the GI clinic today for evaluation of heartburn and abdominal pain. The patient stated that his symptoms began while he was recently in [place]. His symptoms started on January 31st and he presented to an emergency medicine facility in [place], [place]. At that time, he had been experiencing constant abdominal pain for two weeks, which was not relieved by Pepcid. In addition, he had an episode of hematuria two weeks prior but has not had any more episodes since then.    The patient says that the pain was mostly in the epigastric region. He also mentioned that his stomach felt distended or bloated and he was burping a lot. He felt some sharp pain under his left rib and mentioned some heartburn that was affecting his voice. Sometimes the pain was epigastric but other times it was generalized. He denies any nausea, vomiting, blood in the stool, or hematemesis. In the ER in [place], he had routine labwork done which was unremarkable and also a CT a/p which did not show any acute abnormality. He was asked to take Carafate and make dietary changes like avoiding spicy food, tomatoes, and coffee. He hasn't had any alcohol since February 2nd, but despite these changes, his symptoms persist, albeit milder.    The patient admits to having consumed more alcohol two days prior to when his symptoms started on January 31st. After retiring about eight years ago, he had been drinking about 21 drinks a week until 2021. Recently, he has been drinking 14 drinks a week, mostly wine, beer, and spirits. He denies smoking but admits that his anxiety level is really high. He has not taken a PPI and says that he lost  about 8 lbs in the last month and a half since the symptoms started. He also states that he has no appetite but denies early satiety. However, he has regained 2 lbs back.    He denies any nocturnal symptoms such as nocturnal heartburn but admits to taking Xanax occasionally due to his anxiety. He reports that his mother  at the age of 86 from a GI cancer, but he's unsure as to exactly what type of cancer it was. He denies any difficulty swallowing or abdominal pain that improves with defecation. He also denies any other symptoms such as fevers or chills.    Social History:  He has been consuming about 14 alcoholic drinks weekly (wine, beer, and spirits), down from 21 drinks a week since his half-way 8 years ago. He denies smoking. No illicit drug use.     Family History:  His mother passed away at 86 from a GI cancer, but he is unsure of the exact type.    Review of Systems:  - Reported symptoms: Abdominal pain, heartburn, voice changes, bloating, excessive burping, weight loss, lack of appetite, anxiety.  - Denied symptoms: Nausea, vomiting, blood in stool, hematomas, difficulty swallowing, abdominal pain that improves with defecation, nocturnal heartburn, fevers, chills.     Past Medical History  Past Medical History:   Diagnosis Date    Kidney stone 2015    Melanoma (CMS/HCC) 2022    Personal history of malignant neoplasm, unspecified     History of malignant neoplasm    Personal history of other diseases of the musculoskeletal system and connective tissue     History of arthritis    Personal history of other diseases of the nervous system and sense organs     History of cataract    Personal history of other diseases of urinary system     History of kidney problems    Personal history of other mental and behavioral disorders     History of depression    PONV (postoperative nausea and vomiting)     Pure hypercholesterolemia, unspecified     High cholesterol       Surgical History  Past Surgical  History:   Procedure Laterality Date    APPENDECTOMY  08/20/2015    Appendectomy    CERVICAL FUSION  08/20/2015    Cervical Vertebral Fusion    HERNIA REPAIR Right 01/01/1963    NASAL SEPTUM SURGERY  08/20/2015    Nasal Septal Deviation Repair       Social History  Social Determinants of Health     Tobacco Use: Low Risk  (4/1/2024)    Patient History     Smoking Tobacco Use: Never     Smokeless Tobacco Use: Never     Passive Exposure: Never   Alcohol Use: Not on file   Financial Resource Strain: Not on file   Food Insecurity: Not on file   Transportation Needs: Not on file   Physical Activity: Not on file   Stress: Not on file   Social Connections: Not on file   Intimate Partner Violence: Not on file   Depression: Not at risk (10/12/2023)    PHQ-2     PHQ-2 Score: 0   Housing Stability: Not on file   Utilities: Not on file   Digital Equity: Not on file       Family History  Family History   Problem Relation Name Age of Onset    Other (gastroinstestinal cancer) Mother      Lung cancer Father          Allergies  Allergies   Allergen Reactions    Bee Pollen Unknown    Ragweed Unknown    Sulfa (Sulfonamide Antibiotics) Unknown       Home Medications    Current Outpatient Medications:     ALPRAZolam (Xanax) 0.5 mg tablet, Take 1 tablet (0.5 mg) by mouth as needed at bedtime for anxiety., Disp: , Rfl:     azelastine (Astelin) 137 mcg (0.1 %) nasal spray, Administer 1 spray into each nostril 2 times a day. Use in each nostril as directed, Disp: 36 mL, Rfl: 3    buPROPion XL (Wellbutrin XL) 300 mg 24 hr tablet, Take 1 tablet (300 mg) by mouth once daily in the morning. Take before meals., Disp: , Rfl:     FLUoxetine (PROzac) 20 mg capsule, Take 1 capsule (20 mg) by mouth once daily., Disp: , Rfl:     fluticasone (Flonase) 50 mcg/actuation nasal spray, Administer 1 spray into affected nostril(s) 2 times a day., Disp: , Rfl:     gabapentin (Neurontin) 300 mg capsule, Take 1 capsule (300 mg) by mouth 2 times a day., Disp: ,  Rfl:     HYDROcodone-acetaminophen (Norco) 5-325 mg tablet, Take 1 tablet by mouth 2 times a day., Disp: , Rfl:     levothyroxine (Synthroid, Levoxyl) 50 mcg tablet, Take 1 tablet (50 mcg) by mouth once daily., Disp: 90 tablet, Rfl: 3    oxybutynin (Ditropan) 5 mg tablet, Take 1 tablet (5 mg) by mouth 2 times a day., Disp: , Rfl:     sildenafil (Viagra) 100 mg tablet, Take 1 tablet (100 mg) by mouth if needed for erectile dysfunction., Disp: 30 tablet, Rfl: 2    simvastatin (Zocor) 20 mg tablet, Take 1 tablet (20 mg) by mouth once daily at bedtime., Disp: 90 tablet, Rfl: 3    tiZANidine (Zanaflex) 4 mg tablet, Take 1 tablet (4 mg) by mouth every 6 hours during the day., Disp: , Rfl:     traZODone (Desyrel) 50 mg tablet, Take 0.5-1 tablets (25-50 mg) by mouth as needed at bedtime for sleep., Disp: 30 tablet, Rfl: 1    FLUoxetine (PROzac) 20 mg capsule, Take 1 capsule (20 mg) by mouth once daily., Disp: 30 capsule, Rfl: 2    pantoprazole (ProtoNix) 40 mg EC tablet, Take 1 tablet (40 mg) by mouth once daily in the morning. Take before meals. Do not crush, chew, or split., Disp: 60 tablet, Rfl: 0    zolpidem (Ambien) 5 mg tablet, Take by mouth., Disp: , Rfl:   No current facility-administered medications for this visit.       Physical Exam  General: well-nourished, no acute distress  HEENT: PERRLA, EOM intact, no scleral icterus, moist MM  Respiratory: CTA bilaterally, normal work of breathing  Cardiovascular: RRR, no murmurs/rubs/gallops  Abdomen: Soft, nontender, nondistended, bowel sounds present, no masses palpated, no organomeagly  Extremities: no edema, no asterixis  Neuro: alert and oriented, CNII-XII grossly intact, moves all 4 extremities with no focal deficits     Last Recorded Vitals  Blood pressure 118/80, pulse 79, temperature 36.7 °C (98 °F), height 1.829 m (6'), weight 79.4 kg (175 lb), SpO2 98 %.        Lab Results   Component Value Date    WBC 5.2 10/27/2023    HGB 14.7 10/27/2023    HCT 44.3 10/27/2023     MCV 96 10/27/2023     10/27/2023     Lab Results   Component Value Date    GLUCOSE 91 10/27/2023    CALCIUM 8.9 10/27/2023     10/27/2023    K 4.5 10/27/2023    CO2 26 10/27/2023     10/27/2023    BUN 16 10/27/2023    CREATININE 1.10 10/27/2023     Lab Results   Component Value Date    ALT 11 10/27/2023    AST 18 11/03/2020    ALKPHOS 47 11/03/2020    BILITOT 0.9 11/03/2020       Imaging:  Refer to Imaging section for CT a/p from [place]     ASSESSMENT/PLAN:  This patient with a history of multiple medical conditions, including CKD Stage 3, erectile dysfunction, hyperlipidemia, insomnia, hypothyroidism, elevated BPH, major depressive disorder, intermission, and osteoarthritis, presents with predominant symptoms of abdominal pain that's epigastric and sometimes generalized, as well as heartburn, including some symptoms of laryngeopharyngeal reflux.    It is quite possible that the patient's symptoms may have been precipitated by his heavy alcohol use, which the patient endorses, and therefore could be alcohol-related gastritis. Other causes such as esophagitis or other causes of gastritis such as H. pylori gastritis are possible; he denies any use of NSAIDs. His lab work is fairly unremarkable and he does not have any anemia.    The patient's mother has a history of some GI malignancy which is unclear. He has lost 8 pounds per his own admission but has gained 2 pounds back. Given these alarm symptoms, it would be pertinent to perform an endoscopy at this time, though the likelihood of this being a malignancy is extremely unlikely. In addition, I will prescribe a proton pump inhibitor that he can take for 8 weeks.     I spent 55 minutes in the professional and overall care of this patient.    The patient was seen and discussed with GI attending, Dr. Dominguez George.    Iris Herman MD MPH

## 2024-04-02 ASSESSMENT — PAIN SCALES - GENERAL: PAINLEVEL_OUTOF10: 0 - NO PAIN

## 2024-04-05 NOTE — RESULT ENCOUNTER NOTE
A  [employer] message was sent to the patient re: results and recommendations as follows:    Dear Mr. Cedeno,    I am writing you to inform you that the biopsies from your stomach showed there was no signs of H. pylori infection. Although there was mild inflammation seen, likely from reaction to medications, there were no signs of any cancer.     Results were sent to your GI physicians, Emily Herman and Loco Rayo, and your primary care physician, Dr. Asmita Vidal.  Please follow up with them for further evaluation and management.    If you have any questions regarding your endoscopy and/or pathology results, please do not hesitate to contact me at [phone] or by replying to this message.      Sincerely,    Bronson Garcia MD, TIA  Chief Medical   [facility]  Associate Chief and Director of Clinical Operations  Division of Gastroenterology and Liver Disease   Master Clinician  [facility]  Professor of Medicine  [facility]

## 2024-04-11 ENCOUNTER — OFFICE VISIT (OUTPATIENT)
Dept: PRIMARY CARE | Facility: CLINIC | Age: 69
End: 2024-04-11
Payer: MEDICARE

## 2024-04-11 VITALS
HEIGHT: 71 IN | BODY MASS INDEX: 24.42 KG/M2 | SYSTOLIC BLOOD PRESSURE: 120 MMHG | DIASTOLIC BLOOD PRESSURE: 74 MMHG | WEIGHT: 174.4 LBS

## 2024-04-11 DIAGNOSIS — R73.09 ELEVATED GLUCOSE: ICD-10-CM

## 2024-04-11 DIAGNOSIS — R10.13 EPIGASTRIC PAIN: Primary | ICD-10-CM

## 2024-04-11 DIAGNOSIS — C80.1 CHRONIC PAIN AFTER SURGICAL PROCEDURE FOR MALIGNANT NEOPLASM (MULTI): ICD-10-CM

## 2024-04-11 DIAGNOSIS — N18.31 STAGE 3A CHRONIC KIDNEY DISEASE (MULTI): ICD-10-CM

## 2024-04-11 DIAGNOSIS — M46.1 INFLAMMATION OF LEFT SACROILIAC JOINT (CMS-HCC): ICD-10-CM

## 2024-04-11 DIAGNOSIS — F33.42 MAJOR DEPRESSIVE DISORDER, RECURRENT, IN FULL REMISSION (CMS-HCC): ICD-10-CM

## 2024-04-11 DIAGNOSIS — G89.28 CHRONIC PAIN AFTER SURGICAL PROCEDURE FOR MALIGNANT NEOPLASM (MULTI): ICD-10-CM

## 2024-04-11 DIAGNOSIS — E03.8 SUBCLINICAL HYPOTHYROIDISM: ICD-10-CM

## 2024-04-11 DIAGNOSIS — E78.5 HYPERLIPIDEMIA, UNSPECIFIED HYPERLIPIDEMIA TYPE: ICD-10-CM

## 2024-04-11 PROCEDURE — 99214 OFFICE O/P EST MOD 30 MIN: CPT | Performed by: INTERNAL MEDICINE

## 2024-04-11 PROCEDURE — 1160F RVW MEDS BY RX/DR IN RCRD: CPT | Performed by: INTERNAL MEDICINE

## 2024-04-11 PROCEDURE — 1159F MED LIST DOCD IN RCRD: CPT | Performed by: INTERNAL MEDICINE

## 2024-04-11 RX ORDER — ESCITALOPRAM OXALATE 10 MG/1
10 TABLET ORAL
COMMUNITY
Start: 2024-04-09 | End: 2025-04-09

## 2024-04-11 NOTE — PROGRESS NOTES
"Subjective   Reason for Visit: Tate Cedeno is an 69 y.o. male here for f/u       Past Medical, Surgical, and Family History reviewed and updated in chart.    Reviewed all medications by prescribing practitioner or clinical pharmacist (such as prescriptions, OTCs, herbal therapies and supplements) and documented in the medical record.    HPI  Has been in [place]  2-3 mths PTV w/ onset of epigastric pain.  1 episode of gross hematuria.  To ED--> note/CT/labs reviewed.  Treated w/ Pepcid/Carafate, no better  To GI eval last week--> EGD w/ gastropathy.  On PPI x 3 weeks--> \"much better\" but not gone  --> no change w/ eating.  Increased w/ lying.  Ok w/ activity  No TTP  Also, increased belching/bloating--> better    #1 subclinical hypothyroidism-no fatigue.  Overall feels well  #2 elevated PSA-following with urology  #3 hyperlipidemia  #4 ED  #5 impaired fasting sugar    Patient Care Team:  Asmita Vidal MD as PCP - General     Review of Systems    Objective   Vitals:  /74 (BP Location: Left arm, Patient Position: Sitting, BP Cuff Size: Adult)   Ht 1.797 m (5' 10.75\")   Wt 79.1 kg (174 lb 6.4 oz)   BMI 24.50 kg/m²       Physical Exam  Constitutional:       Appearance: Normal appearance.   Cardiovascular:      Rate and Rhythm: Normal rate and regular rhythm.      Pulses: Normal pulses.      Heart sounds: No murmur heard.     No gallop.   Pulmonary:      Effort: Pulmonary effort is normal. No respiratory distress.      Breath sounds: Normal breath sounds. No wheezing, rhonchi or rales.   Neurological:      Mental Status: He is alert.   Psychiatric:         Mood and Affect: Mood normal.         Behavior: Behavior normal.         Thought Content: Thought content normal.         Judgment: Judgment normal.         Lab Results   Component Value Date    WBC 5.2 10/27/2023    HGB 14.7 10/27/2023    HCT 44.3 10/27/2023     10/27/2023    CHOL 185 10/27/2023    TRIG 102 10/27/2023    HDL 54.6 10/27/2023    ALT 11 " "10/27/2023    AST 18 11/03/2020     10/27/2023    K 4.5 10/27/2023     10/27/2023    CREATININE 1.10 10/27/2023    BUN 16 10/27/2023    CO2 26 10/27/2023    TSH 3.87 10/27/2023    PSA 4.9 (H) 10/28/2021    HGBA1C 5.6 10/27/2023      Assessment/Plan   Problem List Items Addressed This Visit    None    #1 subclinical hypothyroid- + fhx. neg ABs.- on increased rx, f/u labs  #2 PSA- f/u   #3 hyperlipidemia- on target. Continue treatment  #4 ED- stable. Transition to generic Viagra for cost reasons  #5 MALLORY- resolved. Normal ekg/exam/EST    #6 AM \"flushing\"- resolved. f/u PRN  #7 back pain- resolved  #8 elevated sugar-subtle. retest. diet/exercise reviewed.  #9 hemorrhoid- f/u colorectal surgery  #10 Insomnia- rare zolpidem use. Asked patient to discontinue use.. Reviewed risk of this medicine at length. I have personally reviewed the OARRS report for the patient. This report is scanned into the electronic medical record. I have considered the risks of abuse, dependence, addiction and diversion. I believe that it is clinically appropriate for the patient to be prescribed this medication. has narcan at home. Advised not to use narcotic in same day or use any alcohol.  #11 colon polyps/rectal skin tag-recommend follow-up colonoscopy q3 years (2023) per colorectal surgery  #12 ckd3- reviewed at length. Discussed differential diagnosis. Suspect at least in part due to NSAID use. Reviewed discontinuing all NSAIDs, staying hydrated. No albuminuria. Renal ultrasound relatively unremarkable.  retest.   #13 HAs- better. \"new daily persistent HA\"- better. f/u neuro [facility]. con't gabapentin  #14 C-spine- increased issues. ongoing epidurals. following w/ NS/painMD. CT/EMG pending.   #15 renal cysts- f/u    #16 Mild normocytic anemia- resolved. Retest  #17 epigastric pain- improved.  Con't PPI.  f/u  GI pending  #18 single episode hematuria-reviewed.  Negative CT scan abdomen pelvis.  Asked patient to follow-up directly " with urologist.  Stressed importance of this follow-up.     shingrix 2/2   [place] due 2026  Rsv vaccine, prevnar 20- reviewed     Follow-up in the fall

## 2024-04-12 PROBLEM — E87.1 HYPONATREMIA: Status: RESOLVED | Noted: 2023-03-29 | Resolved: 2024-04-12

## 2024-05-14 ENCOUNTER — TELEPHONE (OUTPATIENT)
Dept: PRIMARY CARE | Facility: CLINIC | Age: 69
End: 2024-05-14
Payer: MEDICARE

## 2024-05-14 DIAGNOSIS — R10.84 GENERALIZED ABDOMINAL PAIN: ICD-10-CM

## 2024-05-14 DIAGNOSIS — K21.9 GASTROESOPHAGEAL REFLUX DISEASE, UNSPECIFIED WHETHER ESOPHAGITIS PRESENT: ICD-10-CM

## 2024-05-20 RX ORDER — PANTOPRAZOLE SODIUM 40 MG/1
40 TABLET, DELAYED RELEASE ORAL
Qty: 90 TABLET | Refills: 0 | Status: SHIPPED | OUTPATIENT
Start: 2024-05-20 | End: 2024-08-18

## 2024-05-23 ENCOUNTER — LAB (OUTPATIENT)
Dept: LAB | Facility: LAB | Age: 69
End: 2024-05-23
Payer: MEDICARE

## 2024-05-23 DIAGNOSIS — N18.31 STAGE 3A CHRONIC KIDNEY DISEASE (MULTI): ICD-10-CM

## 2024-05-23 DIAGNOSIS — R73.09 ELEVATED GLUCOSE: ICD-10-CM

## 2024-05-23 LAB
ANION GAP SERPL CALC-SCNC: 11 MMOL/L (ref 10–20)
BUN SERPL-MCNC: 18 MG/DL (ref 6–23)
CALCIUM SERPL-MCNC: 9.6 MG/DL (ref 8.6–10.3)
CHLORIDE SERPL-SCNC: 103 MMOL/L (ref 98–107)
CO2 SERPL-SCNC: 29 MMOL/L (ref 21–32)
CREAT SERPL-MCNC: 1.08 MG/DL (ref 0.5–1.3)
EGFRCR SERPLBLD CKD-EPI 2021: 74 ML/MIN/1.73M*2
GLUCOSE SERPL-MCNC: 91 MG/DL (ref 74–99)
POTASSIUM SERPL-SCNC: 5 MMOL/L (ref 3.5–5.3)
SODIUM SERPL-SCNC: 138 MMOL/L (ref 136–145)

## 2024-05-23 PROCEDURE — 80048 BASIC METABOLIC PNL TOTAL CA: CPT

## 2024-05-23 PROCEDURE — 83036 HEMOGLOBIN GLYCOSYLATED A1C: CPT

## 2024-05-23 PROCEDURE — 36415 COLL VENOUS BLD VENIPUNCTURE: CPT

## 2024-05-24 LAB
EST. AVERAGE GLUCOSE BLD GHB EST-MCNC: 114 MG/DL
HBA1C MFR BLD: 5.6 %

## 2024-06-11 DIAGNOSIS — M17.12 PRIMARY OSTEOARTHRITIS OF LEFT KNEE: ICD-10-CM

## 2024-06-13 ENCOUNTER — OFFICE VISIT (OUTPATIENT)
Dept: ORTHOPEDIC SURGERY | Facility: CLINIC | Age: 69
End: 2024-06-13
Payer: MEDICARE

## 2024-06-13 ENCOUNTER — HOSPITAL ENCOUNTER (OUTPATIENT)
Dept: RADIOLOGY | Facility: CLINIC | Age: 69
Discharge: HOME | End: 2024-06-13
Payer: MEDICARE

## 2024-06-13 VITALS — BODY MASS INDEX: 24.36 KG/M2 | WEIGHT: 174 LBS | HEIGHT: 71 IN

## 2024-06-13 DIAGNOSIS — M17.12 PRIMARY OSTEOARTHRITIS OF LEFT KNEE: ICD-10-CM

## 2024-06-13 DIAGNOSIS — M17.12 PRIMARY OSTEOARTHRITIS OF LEFT KNEE: Primary | ICD-10-CM

## 2024-06-13 PROCEDURE — 73562 X-RAY EXAM OF KNEE 3: CPT | Mod: LEFT SIDE | Performed by: RADIOLOGY

## 2024-06-13 PROCEDURE — 73562 X-RAY EXAM OF KNEE 3: CPT | Mod: LT

## 2024-06-13 RX ORDER — TRIAMCINOLONE ACETONIDE 40 MG/ML
40 INJECTION, SUSPENSION INTRA-ARTICULAR; INTRAMUSCULAR
Status: COMPLETED | OUTPATIENT
Start: 2024-06-13 | End: 2024-06-13

## 2024-06-13 RX ORDER — LIDOCAINE HYDROCHLORIDE 10 MG/ML
1 INJECTION INFILTRATION; PERINEURAL
Status: COMPLETED | OUTPATIENT
Start: 2024-06-13 | End: 2024-06-13

## 2024-06-13 RX ADMIN — LIDOCAINE HYDROCHLORIDE 1 ML: 10 INJECTION INFILTRATION; PERINEURAL at 09:55

## 2024-06-13 RX ADMIN — TRIAMCINOLONE ACETONIDE 40 MG: 40 INJECTION, SUSPENSION INTRA-ARTICULAR; INTRAMUSCULAR at 09:55

## 2024-06-18 ENCOUNTER — APPOINTMENT (OUTPATIENT)
Dept: GASTROENTEROLOGY | Facility: CLINIC | Age: 69
End: 2024-06-18
Payer: MEDICARE

## 2024-06-18 VITALS — BODY MASS INDEX: 26.04 KG/M2 | HEART RATE: 70 BPM | WEIGHT: 186 LBS | HEIGHT: 71 IN

## 2024-06-18 DIAGNOSIS — R10.10 PAIN OF UPPER ABDOMEN: ICD-10-CM

## 2024-06-18 DIAGNOSIS — Z86.010 HISTORY OF ADENOMATOUS POLYP OF COLON: ICD-10-CM

## 2024-06-18 DIAGNOSIS — K29.70 GASTRITIS WITHOUT BLEEDING, UNSPECIFIED CHRONICITY, UNSPECIFIED GASTRITIS TYPE: Primary | ICD-10-CM

## 2024-06-18 PROCEDURE — 99204 OFFICE O/P NEW MOD 45 MIN: CPT | Performed by: INTERNAL MEDICINE

## 2024-06-18 ASSESSMENT — ENCOUNTER SYMPTOMS: SHORTNESS OF BREATH: 0

## 2024-06-18 NOTE — PATIENT INSTRUCTIONS
Monitor for any ongoing symptoms and if present then we will restart Pantoprazole. Limit alcohol intake. Due for colonoscopy in 11/2026. Call the office at [phone] option 1 if any recurrent symptoms or issues.

## 2024-06-18 NOTE — PROGRESS NOTES
REASON FOR VISIT:  Abdominal pain   PCP (requesting provider): Asmita Vidal MD.    HPI:  Tate Cedeno is a 69 y.o. male with a past medical history of HLD and hypothyroidism being evaluated for personal history of adenomatous colon polyps and abdominal pain. CT A/P w/ contrast showed diverticulosis (2/2024). RUQ U/S showed low risk gallbladder polyp with suggestion that no follow-up is required, hepatic cyst, and hepatic steatosis (6/2023).    The patient reports he has been having abdominal discomfort in the upper abdomen as well as bloating and belching. This started in January during a road trip to [place]. He also noticed blood in the urine at that time. He was tested for UTI and negative. He had CT scan without specific findings. He was evaluated in the ER in [place] and had cardiac evaluation. He was felt to have gastritis and put on PPI. He had persistent symptoms and then saw Fellow in GI clinic and had EGD. No N/V. He thinks Pantoprazole helped some of the burping issues. He reports that after a course of Pantoprazole that he is feeling much improved. He has now been off Pantoprazole for 3.5 weeks. He stopped alcohol and coffee. No regular NSAIDs.    PSurgHx:   -Appendectomy     FamHx: No known GI cancer     Prior Endoscopy:  -EGD (4/2024): Normal esophagus, mild gastric erythema (H. Pylori -), normal duodenum.   -Colonoscopy (11/2023): Adequate prep, normal TI, 3 mm ascending polyp (TA), 10 mm descending polyp (TA), 8 mm sigmoid polyp (HP), descending and sigmoid diverticulosis, medium sized IH/EH, otherwise normal colon.  -Colonoscopy (11/2020): Anal skin tag removal and two polypectomies (benign mucosa).  -Colonoscopy (10/2013): Good prep, small IH, otherwise normal colon.    PAST MEDICAL HISTORY  Past Medical History:   Diagnosis Date    Kidney stone 02/04/2015    Melanoma (Multi) 07/12/2022    Personal history of malignant neoplasm, unspecified     History of malignant neoplasm    Personal  history of other diseases of the musculoskeletal system and connective tissue     History of arthritis    Personal history of other diseases of the nervous system and sense organs     History of cataract    Personal history of other diseases of urinary system     History of kidney problems    Personal history of other mental and behavioral disorders     History of depression    PONV (postoperative nausea and vomiting)     Pure hypercholesterolemia, unspecified     High cholesterol       PAST SURGICAL HISTORY  Past Surgical History:   Procedure Laterality Date    APPENDECTOMY  08/20/2015    Appendectomy    CERVICAL FUSION  08/20/2015    Cervical Vertebral Fusion    HERNIA REPAIR Right 01/01/1963    NASAL SEPTUM SURGERY  08/20/2015    Nasal Septal Deviation Repair       FAMILY HISTORY  Family History   Problem Relation Name Age of Onset    Other (gastroinstestinal cancer) Mother      Lung cancer Father         SOCIAL HISTORY   reports that he has never smoked. He has never been exposed to tobacco smoke. He has never used smokeless tobacco. He reports current alcohol use of about 12.0 standard drinks of alcohol per week. He reports current drug use. Drugs: Benzodiazepines and Oxycodone.    REVIEW OF SYSTEMS  Review of Systems   Respiratory:  Negative for shortness of breath.    Cardiovascular:  Negative for chest pain.   All other systems reviewed and are negative.    A 10+ point review of systems was otherwise negative except as noted and per HPI.    ALLERGIES  Allergies   Allergen Reactions    Ragweed Unknown    Sulfa (Sulfonamide Antibiotics) Unknown       MEDICATIONS  Current Outpatient Medications   Medication Instructions    ALPRAZolam (XANAX) 0.5 mg, oral, Nightly PRN    azelastine (Astelin) 137 mcg (0.1 %) nasal spray 1 spray, Each Nostril, 2 times daily, Use in each nostril as directed    buPROPion XL (Wellbutrin XL) 300 mg 24 hr tablet 1 tablet, oral, Daily before breakfast    escitalopram (LEXAPRO) 10 mg,  oral, Daily RT    fluticasone (Flonase) 50 mcg/actuation nasal spray 1 spray, nasal, 2 times daily    gabapentin (NEURONTIN) 300 mg, oral, 2 times daily    HYDROcodone-acetaminophen (Norco) 5-325 mg tablet 1 tablet, oral, 2 times daily    levothyroxine (SYNTHROID, LEVOXYL) 50 mcg, oral, Daily    oxybutynin (Ditropan) 5 mg tablet 1 tablet, oral, 2 times daily    pantoprazole (PROTONIX) 40 mg, oral, Daily before breakfast, Do not crush, chew, or split.    sildenafil (VIAGRA) 100 mg, oral, As needed    simvastatin (ZOCOR) 20 mg, oral, Nightly    tiZANidine (Zanaflex) 4 mg tablet 1 tablet, oral, Every 6 hours during day    traZODone (DESYREL) 25-50 mg, oral, Nightly PRN       VITALS  Vitals:    06/18/24 1501   Pulse: 70      Body mass index is 26.31 kg/m².    PHYSICAL EXAM  CONSTITUTIONAL: NAD, appears stated age  EYES: anicteric sclera, sclera clear  HEAD: normocephalic, atraumatic   NECK: supple   PULMONARY: CTAB  CARDIOVASCULAR: RRR, no M/R/G appreciated   ABDOMEN: soft, NTND, +BS, no rebound or guarding   MUSCULOSKELETAL: no edema  SKIN: no jaundice   PSYCHIATRIC: AOx3, appropriate insight and judgement    LABS  WBC   Date Value   10/27/2023 5.2 x10*3/uL   04/17/2023 4.5 x10E9/L   09/15/2022 5.4 x10E9/L   03/24/2022 5.1 x10E9/L     Hemoglobin (g/dL)   Date Value   10/27/2023 14.7   04/17/2023 14.8   09/15/2022 14.3   03/24/2022 14.4     Platelets   Date Value   10/27/2023 292 x10*3/uL   04/17/2023 289 x10E9/L   09/15/2022 281 x10E9/L   03/24/2022 274 x10E9/L     Sodium (mmol/L)   Date Value   05/23/2024 138   10/27/2023 137   04/17/2023 138   09/15/2022 139   11/09/2021 137     Potassium (mmol/L)   Date Value   05/23/2024 5.0   10/27/2023 4.5   04/17/2023 4.8   09/15/2022 4.3   11/09/2021 4.4     Chloride (mmol/L)   Date Value   05/23/2024 103   10/27/2023 103   04/17/2023 105   09/15/2022 104   11/09/2021 101     Bicarbonate (mmol/L)   Date Value   05/23/2024 29   10/27/2023 26   04/17/2023 26   09/15/2022 27  "  11/09/2021 29     Urea Nitrogen (mg/dL)   Date Value   05/23/2024 18   10/27/2023 16   04/17/2023 21   09/15/2022 18   11/09/2021 16     Creatinine (mg/dL)   Date Value   05/23/2024 1.08   10/27/2023 1.10   04/17/2023 1.10   09/15/2022 1.14   11/09/2021 1.04     Calcium (mg/dL)   Date Value   05/23/2024 9.6   10/27/2023 8.9   04/17/2023 9.0   09/15/2022 9.0   11/09/2021 9.7     Total Protein (g/dL)   Date Value   11/03/2020 6.6   11/25/2019 6.7   03/15/2019 6.8     Total Bilirubin (mg/dL)   Date Value   11/03/2020 0.9   11/25/2019 0.8   03/15/2019 0.8     Alkaline Phosphatase (U/L)   Date Value   11/03/2020 47   11/25/2019 46   03/15/2019 44     ALT (U/L)   Date Value   10/27/2023 11     ALT (SGPT) (U/L)   Date Value   09/15/2022 13   04/12/2021 15   11/03/2020 13     AST (U/L)   Date Value   11/03/2020 18   11/25/2019 18   03/15/2019 17     Glucose (mg/dL)   Date Value   05/23/2024 91   10/27/2023 91   04/17/2023 97   09/15/2022 94   11/09/2021 88     No results found for: \"LIPASE\", \"CRP\"    ASSESSMENT/PLAN  Tate Cedeno is a 69 y.o. male with a past medical history of HLD and hypothyroidism being evaluated for personal history of adenomatous colon polyps and abdominal pain. CT A/P w/ contrast showed diverticulosis (2/2024). RUQ U/S showed low risk gallbladder polyp with suggestion that no follow-up is required, hepatic cyst, and hepatic steatosis (6/2023). Patient with episode of upper abdominal pain and belching in setting of heavier diet and increased alcohol intake while in [place]. Recent endoscopies and imaging only notable for some gastric erythema. This was likely episode of GERD and gastritis now resolved after a course of Pantoprazole. We will monitor for any recurrent symptoms.    -Monitor for any recurrent symptoms and advised patient to call the office if present and we will restart Pantoprazole 40 mg daily  -Recommend to limit alcohol intake and avoid NSAIDs  -Due for surveillance colonoscopy " 11/2026    Follow-up in the office PRN.    Signature: Martell Cano MD

## 2024-07-08 ENCOUNTER — APPOINTMENT (OUTPATIENT)
Dept: AUDIOLOGY | Facility: HOSPITAL | Age: 69
End: 2024-07-08
Payer: MEDICARE

## 2024-07-30 ENCOUNTER — APPOINTMENT (OUTPATIENT)
Dept: ORTHOPEDIC SURGERY | Facility: CLINIC | Age: 69
End: 2024-07-30
Payer: COMMERCIAL

## 2024-08-19 ENCOUNTER — APPOINTMENT (OUTPATIENT)
Dept: PRIMARY CARE | Facility: CLINIC | Age: 69
End: 2024-08-19
Payer: MEDICARE

## 2024-08-19 VITALS
HEART RATE: 71 BPM | SYSTOLIC BLOOD PRESSURE: 129 MMHG | TEMPERATURE: 97.7 F | WEIGHT: 174.6 LBS | OXYGEN SATURATION: 98 % | DIASTOLIC BLOOD PRESSURE: 71 MMHG | BODY MASS INDEX: 24.7 KG/M2

## 2024-08-19 DIAGNOSIS — I83.90 VARICOSE VEINS OF CALF: ICD-10-CM

## 2024-08-19 DIAGNOSIS — S03.40XA SPRAIN OF TEMPOROMANDIBULAR JOINT, INITIAL ENCOUNTER: Primary | ICD-10-CM

## 2024-08-19 PROCEDURE — 1160F RVW MEDS BY RX/DR IN RCRD: CPT | Performed by: NURSE PRACTITIONER

## 2024-08-19 PROCEDURE — 1036F TOBACCO NON-USER: CPT | Performed by: NURSE PRACTITIONER

## 2024-08-19 PROCEDURE — 99213 OFFICE O/P EST LOW 20 MIN: CPT | Performed by: NURSE PRACTITIONER

## 2024-08-19 PROCEDURE — 1159F MED LIST DOCD IN RCRD: CPT | Performed by: NURSE PRACTITIONER

## 2024-08-19 RX ORDER — SERTRALINE HYDROCHLORIDE 50 MG/1
50 TABLET, FILM COATED ORAL
COMMUNITY
Start: 2024-07-23 | End: 2025-07-23

## 2024-08-19 ASSESSMENT — ENCOUNTER SYMPTOMS
RESPIRATORY NEGATIVE: 1
CONSTITUTIONAL NEGATIVE: 1
MUSCULOSKELETAL NEGATIVE: 1
PSYCHIATRIC NEGATIVE: 1
NEUROLOGICAL NEGATIVE: 1
CARDIOVASCULAR NEGATIVE: 1

## 2024-08-19 ASSESSMENT — PATIENT HEALTH QUESTIONNAIRE - PHQ9
2. FEELING DOWN, DEPRESSED OR HOPELESS: NOT AT ALL
SUM OF ALL RESPONSES TO PHQ9 QUESTIONS 1 & 2: 0
1. LITTLE INTEREST OR PLEASURE IN DOING THINGS: NOT AT ALL

## 2024-08-19 NOTE — PATIENT INSTRUCTIONS
Please follow up with the dentist for the TMJ  Can follow up at a vein center for treatment of the varicose veins.

## 2024-08-19 NOTE — PROGRESS NOTES
Subjective   Patient ID: Tate Cedeno is a 69 y.o. male who presents for Jaw Pain (On and off for a couple of months.) and vericose veins (On right leg).    Earache   There is pain in the right ear. This is a chronic problem. The current episode started more than 1 month ago. The problem occurs every few hours. The problem has been unchanged. The pain is at a severity of 2/10.   Present s today with pain in the right posterior jaw area.  ST first thought it was in his ear but now realizes it is the TMJ.   No diffculty with chewing or opening mouth but can feel the discomfort at times  Has a mouth guard made 3-4 years old because he was having headaches.  Turns out that was not he issues so did not continue using.  Tried one night and it did not help  Discomfort comes and goes and is feeling better,   Has varicose veins, feel like he has some tingling around them at times. Is wondering what he can do for them.     Review of Systems   Constitutional: Negative.    HENT:  Positive for ear pain.         As noted in HPI     Respiratory: Negative.     Cardiovascular: Negative.    Musculoskeletal: Negative.    Neurological: Negative.    Psychiatric/Behavioral: Negative.         Objective   /71 (BP Location: Left arm, Patient Position: Sitting, BP Cuff Size: Adult)   Pulse 71   Temp 36.5 °C (97.7 °F) (Temporal)   Wt 79.2 kg (174 lb 9.6 oz)   SpO2 98%   BMI 24.70 kg/m²     Physical Exam  Constitutional:       Appearance: Normal appearance.   HENT:      Head:      Comments: Slight pain with palpation over the right TMJ     Right Ear: Tympanic membrane, ear canal and external ear normal.      Left Ear: Tympanic membrane, ear canal and external ear normal.   Cardiovascular:      Rate and Rhythm: Normal rate and regular rhythm.   Pulmonary:      Effort: Pulmonary effort is normal.      Breath sounds: Normal breath sounds.   Musculoskeletal:         General: Normal range of motion.   Skin:     Comments: Right lower  leg anterior area with a few varicosities   Neurological:      General: No focal deficit present.      Mental Status: He is alert.   Psychiatric:         Mood and Affect: Mood normal.         Behavior: Behavior normal.         Assessment/Plan   Problem List Items Addressed This Visit    None  Visit Diagnoses         Codes    Sprain of temporomandibular joint, initial encounter    -  Primary S03.40XA    Varicose veins of calf     I83.90

## 2024-09-05 ENCOUNTER — CLINICAL SUPPORT (OUTPATIENT)
Dept: AUDIOLOGY | Facility: CLINIC | Age: 69
End: 2024-09-05
Payer: MEDICARE

## 2024-09-05 DIAGNOSIS — H90.3 BILATERAL SENSORINEURAL HEARING LOSS: Primary | ICD-10-CM

## 2024-09-05 PROCEDURE — 92567 TYMPANOMETRY: CPT | Performed by: AUDIOLOGIST

## 2024-09-05 PROCEDURE — 92557 COMPREHENSIVE HEARING TEST: CPT | Performed by: AUDIOLOGIST

## 2024-09-05 PROCEDURE — V5299 HEARING SERVICE: HCPCS | Mod: AUDSP | Performed by: AUDIOLOGIST

## 2024-09-05 NOTE — PROGRESS NOTES
AUDIOMETRIC EVALUATION       Name:  Tate Cedeno  :  1955  Age:  69 y.o.  Date of Evaluation:  2024     Ear Make and Model Serial Number /  Tube size Dome Warranty Expiration   Right Phonak Audeo P50 R [id number] 2 moderate small power dome 2025   Left Phonak Audeo P50 R [id number] 2 moderate small closed 2025     HISTORY  Tate Cedeno was seen today for a hearing evaluation due to known bilateral sensorineural hearing loss. He reports his hearing aid wax trap is stuck in the receivers. He does not wear the hearing aids in noise or while golfing. Mostly only wears the devices when he is home with his wife. Reports turning the hearing aids down when he uses them.    Denies tinnitus, vertigo, aural pain, drainage, fullness, history of familial hearing loss or noise exposure.    PROCEDURE:  Otoscopic Evaluation:    RIGHT: Clear ear canal and tympanic membrane visualized.  LEFT:  Clear ear canal and tympanic membrane visualized.    Immittance: Tympanometry (226 Hz probe tone) and Stapedial Acoustic Reflexes Thresholds (ART)(Probe ear):  RIGHT: Normal middle ear pressure, reduced mobility, and normal ear canal volume. Ipsilateral ART -2000 Hz.  LEFT: Normal middle ear pressure, mobility, and ear canal volume. Ipsilateral ART -2000 Hz.    Pure Tone and Speech Audiometry:    Test Technique: Pure Tone Audiometry via TDH headphones  Test Reliability: good    RIGHT: Mild hearing loss through 2000 Hz sloping to moderate  sensorineural hearing loss through 8000 Hz. Word Recognition score was excellent using recorded material (NU-6 10-word list ordered by difficulty).   LEFT: Mild hearing loss through 3000 Hz sloping to moderately severe  sensorineural hearing loss through 8000 Hz. Air bone gap at 4000 Hz. Word Recognition score was excellent using recorded material (NU-6 10-word list ordered by difficulty).     HEARING AID CHECK  Cleaned battery contacts and microphones,  "bilaterally. Changed domes and receivers from 4.0 to 5.0 as wax traps were stuck. Discussed and demonstrated new wax trap system. Provided wax traps and domes. Listening check was adequate following clean and check, bilaterally. Denies wanting programming changes.    EVALUATION  See scanned Audiogram in \"Media\".    IMPRESSIONS:  Today's test results indicate stable hearing as compared to 2021 testing. Mild to moderately severe sensorineural hearing loss bilaterally. Cleaned hearing aids, changed receivers to 5.0 in warranty no charge. listening check ok.    Discussed warranty expiration. He may want to purchase a warranty extension or PRAVEEN warranty in July, 2025. He will return in June, 2025 for a hearing aid check to send the hearing aids to [facility] for a clean and check and battery replacement before they go out of warranty.    RECOMMENDATIONS:  Continue medical follow-up with physician.  Return for audiologic assessment in conjunction with otologic care or annually.   Use of binaural hearing aids during all waking moments. Return to managing audiologist for earmold re-make, hearing aid checks, and programming as indicated.    PATIENT EDUCATION:   Discussed results and recommendations with Tate Cedeno.  Questions were addressed and the patient was encouraged to contact our department ([phone]) should concerns arise.    ULISES Beth, CCC-A  Senior Clinical Audiologist    TIME: [phone]    "

## 2024-09-19 ENCOUNTER — APPOINTMENT (OUTPATIENT)
Dept: ORTHOPEDIC SURGERY | Facility: CLINIC | Age: 69
End: 2024-09-19
Payer: MEDICARE

## 2024-09-19 VITALS — WEIGHT: 174.6 LBS | BODY MASS INDEX: 24.44 KG/M2 | HEIGHT: 71 IN

## 2024-09-19 DIAGNOSIS — M17.12 PRIMARY OSTEOARTHRITIS OF LEFT KNEE: Primary | ICD-10-CM

## 2024-09-19 RX ORDER — LIDOCAINE HYDROCHLORIDE 10 MG/ML
1 INJECTION, SOLUTION INFILTRATION; PERINEURAL
Status: COMPLETED | OUTPATIENT
Start: 2024-09-19 | End: 2024-09-19

## 2024-09-19 RX ORDER — TRIAMCINOLONE ACETONIDE 40 MG/ML
40 INJECTION, SUSPENSION INTRA-ARTICULAR; INTRAMUSCULAR
Status: COMPLETED | OUTPATIENT
Start: 2024-09-19 | End: 2024-09-19

## 2024-09-19 ASSESSMENT — PAIN - FUNCTIONAL ASSESSMENT: PAIN_FUNCTIONAL_ASSESSMENT: 0-10

## 2024-09-19 ASSESSMENT — PAIN SCALES - GENERAL: PAINLEVEL_OUTOF10: 1

## 2024-09-19 NOTE — PROGRESS NOTES
Tate Cedeno is here for a left knee injection. He was last injected 6/13/2024 which lasted 2+ months. No new injures. He is unable to fully extend the knee.  HPI as noted above  EXAM:    GENERAL: A/Ox3, NAD. Appears healthy, well nourished  SKIN: no erythema, rashes, or ecchymoses     MUSCULOSKELETAL:  Laterality: Left knee Exam  - Alignment: partially correctible varus deformity  - ROM: Full with mild crepitus and pain  - Effusion: none  - Strength: knee extension and flexion 5/5, EHL/PF/DF motor intact  - Palpation: TTP mostly along medial joint line  - Stability: Anterior/Posterior stable, varus/valgus stable. Mild pseudo valgus instability  - Gait: mild antalgic  - Hip Exam: flexion to 100+ degrees, full extension, internal/external rotation adequate, and no pain with log roll  - Special Tests: none performed    NEUROVASCULAR:  - Neurovascular Status: sensation intact to light touch distally  - Capillary refill brisk at extremities, Bilateral dorsalis pedis pulse 2+    RADIOGRAPHS none today    ASSESSMENT left knee osteoarthritis    PLAN at his request tolerated repeat Kenalog injection well today.  Continue home range of motion strengthening.  May follow-up in 3 [place]  L Inj/Asp: L knee on 9/19/2024 10:36 AM  Indications: pain  Details: 22 G needle, anterolateral approach  Medications: 1 mL lidocaine 10 mg/mL (1 %); 40 mg triamcinolone acetonide 40 mg/mL  Outcome: tolerated well, no immediate complications  Procedure, treatment alternatives, risks and benefits explained, specific risks discussed. Consent was given by the patient. Immediately prior to procedure a time out was called to verify the correct patient, procedure, equipment, support staff and site/side marked as required. Patient was prepped and draped in the usual sterile fashion.           This note was dictated using speech recognition software and was not corrected for spelling or grammatical errors

## 2024-09-24 DIAGNOSIS — E78.5 HYPERLIPIDEMIA, UNSPECIFIED HYPERLIPIDEMIA TYPE: ICD-10-CM

## 2024-09-25 RX ORDER — SIMVASTATIN 20 MG/1
20 TABLET, FILM COATED ORAL NIGHTLY
Qty: 90 TABLET | Refills: 3 | Status: SHIPPED | OUTPATIENT
Start: 2024-09-25

## 2024-10-22 ENCOUNTER — APPOINTMENT (OUTPATIENT)
Dept: PRIMARY CARE | Facility: CLINIC | Age: 69
End: 2024-10-22
Payer: MEDICARE

## 2024-10-22 VITALS
TEMPERATURE: 97.6 F | DIASTOLIC BLOOD PRESSURE: 68 MMHG | BODY MASS INDEX: 24.5 KG/M2 | SYSTOLIC BLOOD PRESSURE: 102 MMHG | HEIGHT: 71 IN | WEIGHT: 175 LBS

## 2024-10-22 DIAGNOSIS — G47.00 INSOMNIA, UNSPECIFIED TYPE: ICD-10-CM

## 2024-10-22 DIAGNOSIS — E03.9 HYPOTHYROIDISM, UNSPECIFIED TYPE: ICD-10-CM

## 2024-10-22 DIAGNOSIS — N18.31 STAGE 3A CHRONIC KIDNEY DISEASE (MULTI): ICD-10-CM

## 2024-10-22 DIAGNOSIS — N52.9 ERECTILE DYSFUNCTION, UNSPECIFIED ERECTILE DYSFUNCTION TYPE: ICD-10-CM

## 2024-10-22 DIAGNOSIS — E78.5 HYPERLIPIDEMIA, UNSPECIFIED HYPERLIPIDEMIA TYPE: ICD-10-CM

## 2024-10-22 DIAGNOSIS — E03.8 SUBCLINICAL HYPOTHYROIDISM: ICD-10-CM

## 2024-10-22 DIAGNOSIS — Z00.00 WELL ADULT EXAM: Primary | ICD-10-CM

## 2024-10-22 PROCEDURE — G0439 PPPS, SUBSEQ VISIT: HCPCS | Performed by: INTERNAL MEDICINE

## 2024-10-22 PROCEDURE — 1036F TOBACCO NON-USER: CPT | Performed by: INTERNAL MEDICINE

## 2024-10-22 PROCEDURE — 3008F BODY MASS INDEX DOCD: CPT | Performed by: INTERNAL MEDICINE

## 2024-10-22 PROCEDURE — 1159F MED LIST DOCD IN RCRD: CPT | Performed by: INTERNAL MEDICINE

## 2024-10-22 PROCEDURE — 1170F FXNL STATUS ASSESSED: CPT | Performed by: INTERNAL MEDICINE

## 2024-10-22 PROCEDURE — 1124F ACP DISCUSS-NO DSCNMKR DOCD: CPT | Performed by: INTERNAL MEDICINE

## 2024-10-22 PROCEDURE — 99213 OFFICE O/P EST LOW 20 MIN: CPT | Performed by: INTERNAL MEDICINE

## 2024-10-22 RX ORDER — SIMVASTATIN 20 MG/1
20 TABLET, FILM COATED ORAL NIGHTLY
Qty: 90 TABLET | Refills: 3 | Status: SHIPPED | OUTPATIENT
Start: 2024-10-22

## 2024-10-22 RX ORDER — SILDENAFIL 100 MG/1
100 TABLET, FILM COATED ORAL AS NEEDED
Qty: 30 TABLET | Refills: 2 | Status: SHIPPED | OUTPATIENT
Start: 2024-10-22

## 2024-10-22 RX ORDER — TRAZODONE HYDROCHLORIDE 50 MG/1
25-50 TABLET ORAL NIGHTLY PRN
Qty: 30 TABLET | Refills: 1 | Status: SHIPPED | OUTPATIENT
Start: 2024-10-22 | End: 2025-10-22

## 2024-10-22 RX ORDER — LEVOTHYROXINE SODIUM 50 UG/1
50 TABLET ORAL DAILY
Qty: 90 TABLET | Refills: 3 | Status: SHIPPED | OUTPATIENT
Start: 2024-10-22 | End: 2025-10-22

## 2024-10-22 ASSESSMENT — ACTIVITIES OF DAILY LIVING (ADL)
DOING_HOUSEWORK: INDEPENDENT
BATHING: INDEPENDENT
GROCERY_SHOPPING: INDEPENDENT
MANAGING_FINANCES: INDEPENDENT
DRESSING: INDEPENDENT
TAKING_MEDICATION: INDEPENDENT

## 2024-10-22 ASSESSMENT — PATIENT HEALTH QUESTIONNAIRE - PHQ9
1. LITTLE INTEREST OR PLEASURE IN DOING THINGS: NOT AT ALL
2. FEELING DOWN, DEPRESSED OR HOPELESS: NOT AT ALL
SUM OF ALL RESPONSES TO PHQ9 QUESTIONS 1 AND 2: 0
2. FEELING DOWN, DEPRESSED OR HOPELESS: NOT AT ALL
SUM OF ALL RESPONSES TO PHQ9 QUESTIONS 1 AND 2: 0
1. LITTLE INTEREST OR PLEASURE IN DOING THINGS: NOT AT ALL

## 2024-10-22 NOTE — ASSESSMENT & PLAN NOTE
Orders:    sildenafil (Viagra) 100 mg tablet; Take 1 tablet (100 mg) by mouth if needed for erectile dysfunction.

## 2024-10-22 NOTE — ASSESSMENT & PLAN NOTE
Orders:    simvastatin (Zocor) 20 mg tablet; Take 1 tablet (20 mg) by mouth once daily at bedtime.

## 2024-10-22 NOTE — ASSESSMENT & PLAN NOTE
Orders:    levothyroxine (Synthroid, Levoxyl) 50 mcg tablet; Take 1 tablet (50 mcg) by mouth once daily.

## 2024-10-22 NOTE — ASSESSMENT & PLAN NOTE
Orders:    traZODone (Desyrel) 50 mg tablet; Take 0.5-1 tablets (25-50 mg) by mouth as needed at bedtime for sleep.

## 2024-10-22 NOTE — PROGRESS NOTES
"Subjective   Reason for Visit: Tate Cedeno is an 69 y.o. male here for a Medicare Wellness visit.     Past Medical, Surgical, and Family History reviewed and updated in chart.    Reviewed all medications by prescribing practitioner or clinical pharmacist (such as prescriptions, OTCs, herbal therapies and supplements) and documented in the medical record.    HPI    Overall well   Walking regularly  #1 subclinical hypothyroidism-no fatigue.  Overall feels well  #2 elevated PSA-following with urology  #3 hyperlipidemia  #4 ED  #5 impaired fasting sugar  Patient Care Team:  Asmita Vidal MD as PCP - General     Review of Systems    Objective   Vitals:  /68   Temp 36.4 °C (97.6 °F)   Ht 1.791 m (5' 10.5\")   Wt 79.4 kg (175 lb)   BMI 24.75 kg/m²       Physical Exam    Assessment & Plan  Hyperlipidemia, unspecified hyperlipidemia type    Orders:  •  simvastatin (Zocor) 20 mg tablet; Take 1 tablet (20 mg) by mouth once daily at bedtime.    Subclinical hypothyroidism    Orders:  •  levothyroxine (Synthroid, Levoxyl) 50 mcg tablet; Take 1 tablet (50 mcg) by mouth once daily.    Hypothyroidism, unspecified type    Orders:  •  levothyroxine (Synthroid, Levoxyl) 50 mcg tablet; Take 1 tablet (50 mcg) by mouth once daily.  •  TSH with reflex to Free T4 if abnormal; Future    Erectile dysfunction, unspecified erectile dysfunction type    Orders:  •  sildenafil (Viagra) 100 mg tablet; Take 1 tablet (100 mg) by mouth if needed for erectile dysfunction.    Insomnia, unspecified type    Orders:  •  traZODone (Desyrel) 50 mg tablet; Take 0.5-1 tablets (25-50 mg) by mouth as needed at bedtime for sleep.    Well adult exam    Orders:  •  CBC; Future  •  Basic Metabolic Panel; Future  •  Alanine Aminotransferase; Future  •  Lipid Panel; Future  •  Hemoglobin A1C; Future    Stage 3a chronic kidney disease (Multi)    Orders:  •  CBC; Future              #1 subclinical hypothyroid- + fhx. neg ABs.- on increased rx, f/u " "labs  #2 PSA- f/u   #3 hyperlipidemia- on target. Continue treatment  #4 ED- stable. Transition to generic Viagra for cost reasons  #5 MALLORY- resolved. Normal ekg/exam/EST    #6 AM \"flushing\"- resolved. f/u PRN  #7 back pain- resolved  #8 elevated sugar-subtle. retest. diet/exercise reviewed.  #9 hemorrhoid- f/u colorectal surgery  #10 Insomnia- rare zolpidem use. Asked patient to discontinue use.. Reviewed risk of this medicine at length. I have personally reviewed the OARRS report for the patient. This report is scanned into the electronic medical record. I have considered the risks of abuse, dependence, addiction and diversion. I believe that it is clinically appropriate for the patient to be prescribed this medication. has narcan at home. Advised not to use narcotic in same day or use any alcohol.  #11 colon polyps/rectal skin tag-recommend follow-up colonoscopy q3 years (2023) per colorectal surgery  #12 ckd3- reviewed at length. Discussed differential diagnosis. Suspect at least in part due to NSAID use. Reviewed discontinuing all NSAIDs, staying hydrated. No albuminuria. Renal ultrasound relatively unremarkable.  retest.   #13 HAs- better. \"new daily persistent HA\"- better. f/u neuro [facility]. con't gabapentin  #14 C-spine- increased issues. ongoing epidurals. following w/ NS/painMD. CT/EMG pending.   #15 renal cysts- f/u    #16 Mild normocytic anemia- resolved. Retest  #17 epigastric pain- improved.  Con't PPI.  f/u  GI pending  #18 single episode hematuria- f/u  urologist.  Stressed importance of this follow-up.     shingrix 2/2   [place] due 2026  Rsv vaccine, prevnar 20- reviewed     Follow-up in the fall  "

## 2024-12-12 DIAGNOSIS — G47.00 INSOMNIA, UNSPECIFIED TYPE: ICD-10-CM

## 2024-12-12 RX ORDER — TRAZODONE HYDROCHLORIDE 50 MG/1
TABLET ORAL
Qty: 30 TABLET | Refills: 11 | Status: SHIPPED | OUTPATIENT
Start: 2024-12-12

## 2024-12-16 ENCOUNTER — LAB (OUTPATIENT)
Dept: LAB | Facility: LAB | Age: 69
End: 2024-12-16
Payer: MEDICARE

## 2024-12-16 DIAGNOSIS — N18.31 STAGE 3A CHRONIC KIDNEY DISEASE (MULTI): ICD-10-CM

## 2024-12-16 DIAGNOSIS — E03.9 HYPOTHYROIDISM, UNSPECIFIED TYPE: ICD-10-CM

## 2024-12-16 DIAGNOSIS — Z00.00 WELL ADULT EXAM: ICD-10-CM

## 2024-12-16 LAB
ALT SERPL W P-5'-P-CCNC: 12 U/L (ref 10–52)
ANION GAP SERPL CALC-SCNC: 10 MMOL/L (ref 10–20)
BUN SERPL-MCNC: 19 MG/DL (ref 6–23)
CALCIUM SERPL-MCNC: 9 MG/DL (ref 8.6–10.3)
CHLORIDE SERPL-SCNC: 104 MMOL/L (ref 98–107)
CHOLEST SERPL-MCNC: 181 MG/DL (ref 0–199)
CHOLESTEROL/HDL RATIO: 3.5
CO2 SERPL-SCNC: 29 MMOL/L (ref 21–32)
CREAT SERPL-MCNC: 1.17 MG/DL (ref 0.5–1.3)
EGFRCR SERPLBLD CKD-EPI 2021: 67 ML/MIN/1.73M*2
ERYTHROCYTE [DISTWIDTH] IN BLOOD BY AUTOMATED COUNT: 12 % (ref 11.5–14.5)
EST. AVERAGE GLUCOSE BLD GHB EST-MCNC: 108 MG/DL
GLUCOSE SERPL-MCNC: 89 MG/DL (ref 74–99)
HBA1C MFR BLD: 5.4 %
HCT VFR BLD AUTO: 44.4 % (ref 41–52)
HDLC SERPL-MCNC: 52.3 MG/DL
HGB BLD-MCNC: 14.4 G/DL (ref 13.5–17.5)
LDLC SERPL CALC-MCNC: 96 MG/DL
MCH RBC QN AUTO: 31 PG (ref 26–34)
MCHC RBC AUTO-ENTMCNC: 32.4 G/DL (ref 32–36)
MCV RBC AUTO: 96 FL (ref 80–100)
NON HDL CHOLESTEROL: 129 MG/DL (ref 0–149)
NRBC BLD-RTO: 0 /100 WBCS (ref 0–0)
PLATELET # BLD AUTO: 283 X10*3/UL (ref 150–450)
POTASSIUM SERPL-SCNC: 4.5 MMOL/L (ref 3.5–5.3)
RBC # BLD AUTO: 4.64 X10*6/UL (ref 4.5–5.9)
SODIUM SERPL-SCNC: 138 MMOL/L (ref 136–145)
TRIGL SERPL-MCNC: 162 MG/DL (ref 0–149)
TSH SERPL-ACNC: 2.77 MIU/L (ref 0.44–3.98)
VLDL: 32 MG/DL (ref 0–40)
WBC # BLD AUTO: 5.1 X10*3/UL (ref 4.4–11.3)

## 2024-12-16 PROCEDURE — 36415 COLL VENOUS BLD VENIPUNCTURE: CPT

## 2024-12-16 PROCEDURE — 80048 BASIC METABOLIC PNL TOTAL CA: CPT

## 2024-12-16 PROCEDURE — 80061 LIPID PANEL: CPT

## 2024-12-16 PROCEDURE — 83036 HEMOGLOBIN GLYCOSYLATED A1C: CPT

## 2024-12-16 PROCEDURE — 85027 COMPLETE CBC AUTOMATED: CPT

## 2024-12-16 PROCEDURE — 84443 ASSAY THYROID STIM HORMONE: CPT

## 2024-12-16 PROCEDURE — 84460 ALANINE AMINO (ALT) (SGPT): CPT

## 2025-01-02 ENCOUNTER — APPOINTMENT (OUTPATIENT)
Dept: ORTHOPEDIC SURGERY | Facility: CLINIC | Age: 70
End: 2025-01-02
Payer: MEDICARE

## 2025-01-03 ENCOUNTER — APPOINTMENT (OUTPATIENT)
Dept: ORTHOPEDIC SURGERY | Age: 70
End: 2025-01-03
Payer: MEDICARE

## 2025-01-03 VITALS — BODY MASS INDEX: 24.5 KG/M2 | WEIGHT: 175 LBS | HEIGHT: 71 IN

## 2025-01-03 DIAGNOSIS — M17.12 PRIMARY OSTEOARTHRITIS OF LEFT KNEE: Primary | ICD-10-CM

## 2025-01-03 RX ORDER — LIDOCAINE HYDROCHLORIDE 10 MG/ML
1 INJECTION, SOLUTION INFILTRATION; PERINEURAL
Status: COMPLETED | OUTPATIENT
Start: 2025-01-03 | End: 2025-01-03

## 2025-01-03 RX ORDER — TRIAMCINOLONE ACETONIDE 40 MG/ML
40 INJECTION, SUSPENSION INTRA-ARTICULAR; INTRAMUSCULAR
Status: COMPLETED | OUTPATIENT
Start: 2025-01-03 | End: 2025-01-03

## 2025-01-03 RX ADMIN — LIDOCAINE HYDROCHLORIDE 1 ML: 10 INJECTION, SOLUTION INFILTRATION; PERINEURAL at 12:41

## 2025-01-03 RX ADMIN — TRIAMCINOLONE ACETONIDE 40 MG: 40 INJECTION, SUSPENSION INTRA-ARTICULAR; INTRAMUSCULAR at 12:41

## 2025-01-03 NOTE — PROGRESS NOTES
Tate Cedeno is coming in with left knee pain. No Hx of trauma, or sx. Patient last received injection 9/19/2024, moderate relief. Last about 2months. Patient here to discuss another injection.     MUSCULOSKELETAL:  Laterality: Left knee Exam  - Alignment: partially correctible varus deformity  - ROM: Full with mild crepitus and pain  - Effusion: none  - Strength: knee extension and flexion 5/5, EHL/PF/DF motor intact  - Palpation: TTP mostly along medial joint line  - Stability: Anterior/Posterior stable, varus/valgus stable. Mild pseudo valgus instability  - Gait: mild antalgic  - Hip Exam: flexion to 100+ degrees, full extension, internal/external rotation adequate, and no pain with log roll  - Special Tests: none performed  Assessment plan: Left knee osteoarthritis, patient still having relief with injections and tolerated another Kenalog injection today.  Follow-up if no improvement  L Inj/Asp: L knee on 1/3/2025 12:41 PM  Indications: pain  Details: 22 G needle, anterolateral approach  Medications: 1 mL lidocaine 10 mg/mL (1 %); 40 mg triamcinolone acetonide 40 mg/mL  Outcome: tolerated well, no immediate complications  Procedure, treatment alternatives, risks and benefits explained, specific risks discussed. Consent was given by the patient. Immediately prior to procedure a time out was called to verify the correct patient, procedure, equipment, support staff and site/side marked as required. Patient was prepped and draped in the usual sterile fashion.

## 2025-04-10 ENCOUNTER — APPOINTMENT (OUTPATIENT)
Dept: PRIMARY CARE | Facility: CLINIC | Age: 70
End: 2025-04-10
Payer: MEDICARE

## 2025-04-10 VITALS
TEMPERATURE: 97.9 F | DIASTOLIC BLOOD PRESSURE: 74 MMHG | SYSTOLIC BLOOD PRESSURE: 116 MMHG | BODY MASS INDEX: 25.3 KG/M2 | WEIGHT: 181.4 LBS

## 2025-04-10 DIAGNOSIS — E03.8 SUBCLINICAL HYPOTHYROIDISM: ICD-10-CM

## 2025-04-10 DIAGNOSIS — E03.9 HYPOTHYROIDISM, UNSPECIFIED TYPE: ICD-10-CM

## 2025-04-10 DIAGNOSIS — E78.5 HYPERLIPIDEMIA, UNSPECIFIED HYPERLIPIDEMIA TYPE: ICD-10-CM

## 2025-04-10 DIAGNOSIS — N18.31 STAGE 3A CHRONIC KIDNEY DISEASE (MULTI): Primary | ICD-10-CM

## 2025-04-10 DIAGNOSIS — R73.09 ELEVATED GLUCOSE: ICD-10-CM

## 2025-04-10 PROCEDURE — 1036F TOBACCO NON-USER: CPT | Performed by: INTERNAL MEDICINE

## 2025-04-10 PROCEDURE — 1160F RVW MEDS BY RX/DR IN RCRD: CPT | Performed by: INTERNAL MEDICINE

## 2025-04-10 PROCEDURE — 1159F MED LIST DOCD IN RCRD: CPT | Performed by: INTERNAL MEDICINE

## 2025-04-10 PROCEDURE — 1123F ACP DISCUSS/DSCN MKR DOCD: CPT | Performed by: INTERNAL MEDICINE

## 2025-04-10 PROCEDURE — G2211 COMPLEX E/M VISIT ADD ON: HCPCS | Performed by: INTERNAL MEDICINE

## 2025-04-10 PROCEDURE — 99214 OFFICE O/P EST MOD 30 MIN: CPT | Performed by: INTERNAL MEDICINE

## 2025-04-10 RX ORDER — SIMVASTATIN 20 MG/1
20 TABLET, FILM COATED ORAL NIGHTLY
Qty: 90 TABLET | Refills: 3 | Status: SHIPPED | OUTPATIENT
Start: 2025-04-10

## 2025-04-10 RX ORDER — LEVOTHYROXINE SODIUM 50 UG/1
50 TABLET ORAL DAILY
Qty: 90 TABLET | Refills: 3 | Status: SHIPPED | OUTPATIENT
Start: 2025-04-10 | End: 2026-04-10

## 2025-04-10 ASSESSMENT — PATIENT HEALTH QUESTIONNAIRE - PHQ9
1. LITTLE INTEREST OR PLEASURE IN DOING THINGS: NOT AT ALL
SUM OF ALL RESPONSES TO PHQ9 QUESTIONS 1 AND 2: 0
2. FEELING DOWN, DEPRESSED OR HOPELESS: NOT AT ALL

## 2025-04-10 NOTE — ASSESSMENT & PLAN NOTE
Orders:    simvastatin (Zocor) 20 mg tablet; Take 1 tablet (20 mg) by mouth once daily at bedtime.    Alanine Aminotransferase; Future    Lipid Panel; Future

## 2025-04-10 NOTE — PROGRESS NOTES
Subjective   Reason for Visit: Tate Cedeno is an 70 y.o. male here for f/u      HPI    Overall well   Walking regularly, slow pace  #1 subclinical hypothyroidism-no fatigue.  Overall feels well  #2 elevated PSA-following with urology  #3 hyperlipidemia  #4 ED  #5 impaired fasting sugar    Patient Care Team:  Asmita Vidal MD as PCP - General  [facility]mit[facility] Vidal [facility] PCP [facility]     Review of Systems    Objective   Vitals:  There were no vitals taken for this visit.      Physical Exam      Lab Results   Component Value Date    WBC 5.1 12/16/2024    HGB 14.4 12/16/2024    HCT 44.4 12/16/2024     12/16/2024    CHOL 181 12/16/2024    TRIG 162 (H) 12/16/2024    HDL 52.3 12/16/2024    ALT 12 12/16/2024    AST 18 11/03/2020     12/16/2024    K 4.5 12/16/2024     12/16/2024    CREATININE 1.17 12/16/2024    BUN 19 12/16/2024    CO2 29 12/16/2024    TSH 2.77 12/16/2024    PSA 4.9 (H) 10/28/2021    HGBA1C 5.4 12/16/2024       Assessment & Plan  Hyperlipidemia, unspecified hyperlipidemia type    Orders:  •  simvastatin (Zocor) 20 mg tablet; Take 1 tablet (20 mg) by mouth once daily at bedtime.  •  Alanine Aminotransferase; Future  •  Lipid Panel; Future    Subclinical hypothyroidism    Orders:  •  levothyroxine (Synthroid, Levoxyl) 50 mcg tablet; Take 1 tablet (50 mcg) by mouth once daily.    Hypothyroidism, unspecified type    Orders:  •  levothyroxine (Synthroid, Levoxyl) 50 mcg tablet; Take 1 tablet (50 mcg) by mouth once daily.  •  TSH with reflex to Free T4 if abnormal; Future    Stage 3a chronic kidney disease (Multi)    Orders:  •  CBC; Future  •  Basic Metabolic Panel; Future    Elevated glucose    Orders:  •  CBC; Future  •  Hemoglobin A1C; Future              #1 subclinical hypothyroid- + fhx. neg ABs.- on increased rx, f/u labs  #2 PSA- f/u   #3 hyperlipidemia- on target. Continue treatment  #4 ED- stable. Transition to generic Viagra for cost reasons  #5 MALLORY- resolved.  "Normal ekg/exam/EST    #6 AM \"flushing\"- resolved. f/u PRN  #7 back pain- resolved  #8 elevated sugar-subtle. retest. diet/exercise reviewed.  #9 hemorrhoid- f/u colorectal surgery  #10 Insomnia- rare zolpidem use. Asked patient to discontinue use.. Reviewed risk of this medicine at length. I have personally reviewed the OARRS report for the patient. This report is scanned into the electronic medical record. I have considered the risks of abuse, dependence, addiction and diversion. I believe that it is clinically appropriate for the patient to be prescribed this medication. has narcan at home. Advised not to use narcotic in same day or use any alcohol.  #11 colon polyps/rectal skin tag-recommend follow-up colonoscopy q3 years (2023) per colorectal surgery  #12 ckd3- reviewed at length. Discussed differential diagnosis. Suspect at least in part due to NSAID use. Reviewed discontinuing all NSAIDs, staying hydrated. No albuminuria. Renal ultrasound relatively unremarkable.  retest.   #13 HAs- better. \"new daily persistent HA\"- better. f/u neuro [facility]. con't gabapentin  #14 C-spine- increased issues. ongoing epidurals. following w/ NS/painMD. CT/EMG pending.   #15 renal cysts- f/u    #16 Mild normocytic anemia- resolved. Retest  #17 epigastric pain- improved.  Con't PPI.  f/u  GI pending  #18 single episode hematuria- f/u  urologist.  Stressed importance of this follow-up.     shingrix 2/2   Prentice due 2026  Rsv vaccine, prevnar 20- reviewed     Follow-up in the fall  "

## 2025-04-17 ENCOUNTER — APPOINTMENT (OUTPATIENT)
Dept: ORTHOPEDIC SURGERY | Facility: CLINIC | Age: 70
End: 2025-04-17
Payer: MEDICARE

## 2025-04-17 VITALS — BODY MASS INDEX: 25.34 KG/M2 | HEIGHT: 71 IN | WEIGHT: 181 LBS

## 2025-04-17 DIAGNOSIS — M17.12 PRIMARY OSTEOARTHRITIS OF LEFT KNEE: Primary | ICD-10-CM

## 2025-04-17 RX ORDER — TRIAMCINOLONE ACETONIDE 40 MG/ML
40 INJECTION, SUSPENSION INTRA-ARTICULAR; INTRAMUSCULAR
Status: COMPLETED | OUTPATIENT
Start: 2025-04-17 | End: 2025-04-17

## 2025-04-17 RX ORDER — LIDOCAINE HYDROCHLORIDE 10 MG/ML
1 INJECTION, SOLUTION INFILTRATION; PERINEURAL
Status: COMPLETED | OUTPATIENT
Start: 2025-04-17 | End: 2025-04-17

## 2025-04-17 RX ADMIN — LIDOCAINE HYDROCHLORIDE 1 ML: 10 INJECTION, SOLUTION INFILTRATION; PERINEURAL at 13:08

## 2025-04-17 RX ADMIN — TRIAMCINOLONE ACETONIDE 40 MG: 40 INJECTION, SUSPENSION INTRA-ARTICULAR; INTRAMUSCULAR at 13:08

## 2025-04-17 ASSESSMENT — ENCOUNTER SYMPTOMS
LOSS OF SENSATION IN FEET: 0
DEPRESSION: 0
OCCASIONAL FEELINGS OF UNSTEADINESS: 0

## 2025-04-17 ASSESSMENT — PAIN - FUNCTIONAL ASSESSMENT: PAIN_FUNCTIONAL_ASSESSMENT: 0-10

## 2025-04-17 ASSESSMENT — PAIN SCALES - GENERAL: PAINLEVEL_OUTOF10: 5 - MODERATE PAIN

## 2025-04-17 NOTE — PROGRESS NOTES
EPV patient is presenting with left knee pain. Has had injections in his left knee. Last injection was 1/3/2025 with improvement. Would like an injection today.     MUSCULOSKELETAL:  Laterality: Left knee Exam  - Alignment: partially correctible varus deformity  - ROM: Full with mild crepitus and pain  - Effusion: none  - Strength: knee extension and flexion 5/5, EHL/PF/DF motor intact  - Palpation: TTP mostly along medial joint line  - Stability: Anterior/Posterior stable, varus/valgus stable. Mild pseudo valgus instability  - Gait: mild antalgic  - Hip Exam: flexion to 100+ degrees, full extension, internal/external rotation adequate, and no pain with log roll  - Special Tests: none performed  Assessment plan: Left knee osteoarthritis, patient still having relief with injections and tolerated another Kenalog injection today.  Follow-up if no improvement  L Inj/Asp: L knee on 4/17/2025 1:08 PM  Indications: pain  Details: 22 G needle, anterolateral approach  Medications: 1 mL lidocaine 10 mg/mL (1 %); 40 mg triamcinolone acetonide 40 mg/mL  Outcome: tolerated well, no immediate complications  Procedure, treatment alternatives, risks and benefits explained, specific risks discussed. Consent was given by the patient. Immediately prior to procedure a time out was called to verify the correct patient, procedure, equipment, support staff and site/side marked as required. Patient was prepped and draped in the usual sterile fashion.

## 2025-05-28 LAB
ALT SERPL-CCNC: 10 U/L (ref 9–46)
ANION GAP SERPL CALCULATED.4IONS-SCNC: 11 MMOL/L (CALC) (ref 7–17)
BUN SERPL-MCNC: 19 MG/DL (ref 7–25)
BUN/CREAT SERPL: ABNORMAL (CALC) (ref 6–22)
CALCIUM SERPL-MCNC: 9.4 MG/DL (ref 8.6–10.3)
CHLORIDE SERPL-SCNC: 102 MMOL/L (ref 98–110)
CHOLEST SERPL-MCNC: 187 MG/DL
CHOLEST/HDLC SERPL: 3.1 (CALC)
CO2 SERPL-SCNC: 24 MMOL/L (ref 20–32)
CREAT SERPL-MCNC: 1.1 MG/DL (ref 0.7–1.28)
EGFRCR SERPLBLD CKD-EPI 2021: 72 ML/MIN/1.73M2
ERYTHROCYTE [DISTWIDTH] IN BLOOD BY AUTOMATED COUNT: 11.7 % (ref 11–15)
EST. AVERAGE GLUCOSE BLD GHB EST-MCNC: 117 MG/DL
EST. AVERAGE GLUCOSE BLD GHB EST-SCNC: 6.5 MMOL/L
GLUCOSE SERPL-MCNC: 100 MG/DL (ref 65–99)
HBA1C MFR BLD: 5.7 %
HCT VFR BLD AUTO: 42.8 % (ref 38.5–50)
HDLC SERPL-MCNC: 60 MG/DL
HGB BLD-MCNC: 14.3 G/DL (ref 13.2–17.1)
LDLC SERPL CALC-MCNC: 109 MG/DL (CALC)
MCH RBC QN AUTO: 32.1 PG (ref 27–33)
MCHC RBC AUTO-ENTMCNC: 33.4 G/DL (ref 32–36)
MCV RBC AUTO: 96.2 FL (ref 80–100)
NONHDLC SERPL-MCNC: 127 MG/DL (CALC)
PLATELET # BLD AUTO: 286 THOUSAND/UL (ref 140–400)
PMV BLD REES-ECKER: 10.1 FL (ref 7.5–12.5)
POTASSIUM SERPL-SCNC: 4.5 MMOL/L (ref 3.5–5.3)
RBC # BLD AUTO: 4.45 MILLION/UL (ref 4.2–5.8)
SODIUM SERPL-SCNC: 137 MMOL/L (ref 135–146)
TRIGL SERPL-MCNC: 89 MG/DL
TSH SERPL-ACNC: 3.01 MIU/L (ref 0.4–4.5)
WBC # BLD AUTO: 5.2 THOUSAND/UL (ref 3.8–10.8)

## 2025-06-05 ENCOUNTER — APPOINTMENT (OUTPATIENT)
Dept: AUDIOLOGY | Facility: CLINIC | Age: 70
End: 2025-06-05
Payer: MEDICARE

## 2025-07-24 ENCOUNTER — APPOINTMENT (OUTPATIENT)
Dept: ORTHOPEDIC SURGERY | Facility: CLINIC | Age: 70
End: 2025-07-24
Payer: MEDICARE

## 2025-07-24 VITALS — HEIGHT: 71 IN | WEIGHT: 181 LBS | BODY MASS INDEX: 25.34 KG/M2

## 2025-07-24 DIAGNOSIS — M17.12 PRIMARY OSTEOARTHRITIS OF LEFT KNEE: Primary | ICD-10-CM

## 2025-07-24 RX ADMIN — TRIAMCINOLONE ACETONIDE 40 MG: 40 INJECTION, SUSPENSION INTRA-ARTICULAR; INTRAMUSCULAR at 07:24

## 2025-07-24 RX ADMIN — LIDOCAINE HYDROCHLORIDE 1 ML: 10 INJECTION, SOLUTION INFILTRATION; PERINEURAL at 07:24

## 2025-07-24 NOTE — PROGRESS NOTES
Patient is here today for a follow up on left knee pain. It was last injected on 4/17/25. It last about 2.5 months. He would like another one today.  He had good relief from the prior injection And has no other issues today.    aterality: Left knee Exam  - Alignment: partially correctible varus deformity  - ROM: Full with mild crepitus and pain  - Effusion: none  - Strength: knee extension and flexion 5/5, EHL/PF/DF motor intact  - Palpation: TTP mostly along medial joint line  - Stability: Anterior/Posterior stable, varus/valgus stable. Mild pseudo valgus instability  - Gait: mild antalgic  - Hip Exam: flexion to 100+ degrees, full extension, internal/external rotation adequate, and no pain with log roll  - Special Tests: none performed  Assessment plan: Left knee osteoarthritis, patient still having relief with injections and tolerated another Kenalog injection today.  Follow-up if no improvement    L Inj/Asp: L knee on 7/24/2025 7:24 AM  Indications: pain  Details: 22 G needle, anterolateral approach  Medications: 1 mL lidocaine 10 mg/mL (1 %); 40 mg triamcinolone acetonide 40 mg/mL  Outcome: tolerated well, no immediate complications  Procedure, treatment alternatives, risks and benefits explained, specific risks discussed. Consent was given by the patient. Immediately prior to procedure a time out was called to verify the correct patient, procedure, equipment, support staff and site/side marked as required. Patient was prepped and draped in the usual sterile fashion.

## 2025-07-25 RX ORDER — LIDOCAINE HYDROCHLORIDE 10 MG/ML
1 INJECTION, SOLUTION INFILTRATION; PERINEURAL
Status: COMPLETED | OUTPATIENT
Start: 2025-07-24 | End: 2025-07-24

## 2025-07-25 RX ORDER — TRIAMCINOLONE ACETONIDE 40 MG/ML
40 INJECTION, SUSPENSION INTRA-ARTICULAR; INTRAMUSCULAR
Status: COMPLETED | OUTPATIENT
Start: 2025-07-24 | End: 2025-07-24

## 2025-08-07 ENCOUNTER — CLINICAL SUPPORT (OUTPATIENT)
Dept: AUDIOLOGY | Facility: CLINIC | Age: 70
End: 2025-08-07

## 2025-08-07 DIAGNOSIS — H90.3 SENSORINEURAL HEARING LOSS, BILATERAL: Primary | ICD-10-CM

## 2025-08-07 NOTE — PROGRESS NOTES
"AUDIOLOGY HEARING AID CHECK     Name: Tate Cedeno   : 1955 Age: 70 y.o.   Date of Evaluation: 2025 Time: ***     RECOMMENDATIONS   {HA Recommendations:02751::\"Strive for full-time device use during waking hours, except when activities preclude device safety.\",\"Continue medical follow up with primary care provider and/or Ears Nose and Throat (ENT) provider as recommended.\",\"Avoid exposure to loud sounds by moving away from the noise, turning down the volume, or wearing proper hearing protection correctly.\"}    HISTORY   Mr. Cedeno arrived for a hearing aid check. Today, he reports {[facility] Report:58583}. Patient is ***satisfied with the volume of the {markell ha:13455::\"hearing aids\"}*** and reported ***good battery life.     Mr. Cedeno's most recent hearing evaluation was performed on 2024, and indicated mild sloping to moderate sensorineural hearing loss in the right ear and mild sloping to moderately-severe sensorineural hearing loss in the left ear, with excellent word recognition in both ears.    HEARING AID CHECK     Hearing Aid Information Right Left    Phonak Phonak   Model Audéo P50-R Audéo P50-R   Serial Number [id number]   /Tubing 2M 2M   Dome Small power Small vented   Retention No No   Wax Traps {HA Wax Traps:47113} {HA Wax Traps:54919}   Battery Rechargeable Rechargeable     Repair Warranty 2025   Loss and Damage Warranty 2025   Fitting Date 2022   Fitting Audiologist Bassam Beth CCC-KIRAN     Paired to Phone for phone calls: {YES (DEF)/NO:47273}  Paired to smart phone application: {YES (DEF)/NO:42779}  Fitting formula: {Fitting Formulas:25264}  Gain Level: ***%  Volume controls active: {YES (DEF)/NO:89003}    Programs:  ***     PROCEDURE   Otoscopy revealed ***clear ear canals with visible cones of light bilaterally.      ***Billing***    PATIENT EDUCATION   Discussed results and recommendations with Mr. Cedeno{markell results discussion " (Optional):70402}. Questions were addressed and the patient was encouraged to contact our department at [phone] should concerns arise.       ULISES Rizvi, CCC-A  Licensed Clinical Audiologist

## 2025-10-10 ENCOUNTER — APPOINTMENT (OUTPATIENT)
Dept: PRIMARY CARE | Facility: CLINIC | Age: 70
End: 2025-10-10
Payer: MEDICARE